# Patient Record
Sex: FEMALE | Race: WHITE | Employment: OTHER | URBAN - METROPOLITAN AREA
[De-identification: names, ages, dates, MRNs, and addresses within clinical notes are randomized per-mention and may not be internally consistent; named-entity substitution may affect disease eponyms.]

---

## 2017-03-06 ENCOUNTER — HOSPITAL ENCOUNTER (OUTPATIENT)
Dept: PHYSICAL THERAPY | Age: 67
Discharge: HOME | End: 2017-03-06
Payer: COMMERCIAL

## 2017-03-06 PROCEDURE — 97162 PT EVAL MOD COMPLEX 30 MIN: CPT

## 2017-03-06 PROCEDURE — G8979 MOBILITY GOAL STATUS: HCPCS

## 2017-03-06 PROCEDURE — G8978 MOBILITY CURRENT STATUS: HCPCS

## 2017-03-06 NOTE — PROGRESS NOTES
Ambulatory/Rehab Services H2 Model Falls Risk Assessment    Risk Factor Pts. ·   Confusion/Disorientation/Impulsivity  []    4 ·   Symptomatic Depression  []   2 ·   Altered Elimination  []   1 ·   Dizziness/Vertigo  [x]   1 ·   Gender (Male)  []   1 ·   Any administered antiepileptics (anticonvulsants):  []   2 ·   Any administered benzodiazepines:  []   1 ·   Visual Impairment (specify):  []   1 ·   Portable Oxygen Use  []   1 ·   Orthostatic ? BP  []   1 ·   History of Recent Falls (within 3 mos.)  []   5     Ability to Rise from Chair (choose one) Pts. ·   Ability to rise in a single movement  []   0 ·   Pushes up, successful in one attempt  [x]   1 ·   Multiple attempts, but successful  []   3 ·   Unable to rise without assistance  []   4   Total: (5 or greater = High Risk) 2     Falls Prevention Plan:   []                Physical Limitations to Exercise (specify):   []                Mobility Assistance Device (type):   [x]                Exercise/Equipment Adaptation (specify): proper guarding during exercises  ©2010 [facility] [address] [place] [address] Patent #[id number].  Federal Law prohibits the replication, distribution or use without written permission from [facility]

## 2017-03-06 NOTE — PROGRESS NOTES
Tang Lugo  : 1950 [facility]  [facility] [address], [place] [address]  [phone]   [phone]        OUTPATIENT PHYSICAL THERAPY:Initial Assessment 3/6/2017    ICD-10: Treatment Diagnosis: Muscle weakness (generalized) (M62.81), Difficulty in walking, not elsewhere classified (R26.2), Dizziness and giddiness (R42)  Precautions/Allergies:   Acetaminophen; Penicillin g; and Other medication   Fall Risk Score: 2 (? 5 = High Risk)  MD Orders: evaluate and treat MEDICAL/REFERRING DIAGNOSIS:  Unspecified abnormalities of gait and mobility [R26.9]   REFERRING PHYSICIAN: Yahaira Mohamud MD  RETURN PHYSICIAN APPOINTMENT: --     INITIAL ASSESSMENT:  Ms. Katey Merrill presents with balance deficits that affect gait and function. She demonstrates general weakness in LEs, but also sudden balance issues with head movements, and is unable to lay supine due to increased dizziness. There is a concern for vertigo symptoms affecting balance also. Pt will benefit from skilled physical therapy to address dysfunctions, we will also try to get her in to see a physical therapist that specializes in vestibular disorders. PROBLEM LIST (Impacting functional limitations):  1. Decreased Strength  2. Decreased Transfer Abilities  3. Decreased Ambulation Ability/Technique  4. Decreased Balance  5. Decreased [place] with Home Exercise Program INTERVENTIONS PLANNED:  1. Home Exercise Program (HEP)  2. Manual Therapy including joint and soft tissue manipulation and mobilization, and dry needling  3. Therapeutic Exercise/Strengthening  4. Modalities including heat/cold application and electrical stimulation   TREATMENT PLAN:  Effective Dates: 3/6/17 TO 17. Frequency/Duration: 1-2 times a week for 5 weeks  GOALS: (Goals have been discussed and agreed upon with patient.)  Short-Term Functional Goals: Time Frame: 5 weeks  1. Pt will be independent with > or = 2 exercises in HEP. 2. Pt will demonstrate > or = 23 on Mejia. Discharge Goals: Time Frame: 10 weeks  1. Pt will be independent with > or = 4 exercises in HEP. 2. Pt will demonstrate > or = 34 on Mejia. Rehabilitation Potential For Stated Goals: [address] 99 Weeks's therapy, I certify that the treatment plan above will be carried out by a therapist or under their direction. Thank you for this referral,  Delgado Amado, PT, DPT     Referring Physician Signature: Mckenzie Ross MD              Date                    The information in this section was collected on 3/6/17 (except where otherwise noted). HISTORY:   History of Present Injury/Illness (Reason for Referral):  Pt has a report of balance issues ongoing for a while. Her doctor referred to physical therapy to address balance deficits and weakness in LE's. B knee pain that has been helped by injections in the past for short periods of time. Also suffers from [employer]". Has been seeing her son-in-law who is a chiropractor for the vertigo, which has helped some but has no lasting effect. Also broke R ankle many years ago and thinks that the R LE is generally weaker and less stable. Past Medical History/Comorbidities:   Ms. Caity Drummond  has a past medical history of Abdominal wall hernias (11/15/2016); Allergic rhinitis (11/15/2016); Arthritis (11/15/2016); Bipolar disorder ([address] (11/15/2016); CHF (congestive heart failure) ([address] (11/15/2016); Diabetes mellitus type 2, controlled ([address] (11/15/2016); Diverticulitis of colon (without mention of hemorrhage) (6/17/2013); Diverticulosis (11/15/2016); Dyslipidemia (11/15/2016); Esophageal reflux (11/15/2016); Fatigue (11/15/2016); Gout, Acute (11/15/2016); Heart failure ([address] Hypertension; Hypertension, essential, benign (11/15/2016); Hypomagnesemia (11/15/2016); Knee pain (11/15/2016); Morbid obesity ([address] (11/15/2016); Osteoarthritis (11/15/2016); Short bowel syndrome (11/15/2016);  Tremor, hereditary, benign (11/15/2016); 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 Ms. Dania Jaramillo  has a past surgical history that includes cholecystectomy; tubal ligation; hernia repair; and colectomy. Social History/Living Environment:     house, lives with daughter  Prior Level of Function/Work/Activity:  independent  Current Medications:       Current Outpatient Prescriptions:   [address] 364. REC. ANLOG (LANTUS SOLOSTAR SC), 100Unit/ML, 20 Units at bedtime, Disp: , Rfl:     losartan (COZAAR) 100 mg tablet, Take 100 mg by mouth daily. Instead of lisinopril, Disp: , Rfl:     loratadine (CLARITIN) 10 mg tablet, Take 10 mg by mouth daily. , Disp: , Rfl:     diclofenac (VOLTAREN) 1 % gel, 4 g. Apply 4g to knees up to 4 x per day and 2g to shoulder up to 4 x per day for OA pain, Disp: , Rfl:     traMADol (ULTRAM) 50 mg tablet, Take 50 mg by mouth. 1/2 to 1 tablet 3 x per day as needed for arthritis pain, Disp: , Rfl:     Cetirizine (ZYRTEC) 10 mg cap, Take 10 mg by mouth nightly., Disp: , Rfl:     colestipol (COLESTID) 1 gram tablet, Take 1 g by mouth. 2 tablets 2 times daily, Disp: , Rfl:     PEN NEEDLE, DIABETIC (PEN NEEDLES), 5/16\" 31G x 8MM, 1 Box 2 x per day, Disp: , Rfl:     isosorbide mononitrate ER (IMDUR) 30 mg tablet, 30 mg. 1/2 oral at bedtime, Disp: , Rfl:     MULTIVITAMIN PO, Active., Disp: , Rfl:     ASCORBATE CALCIUM (VITAMIN C PO), Active., Disp: , Rfl:     aspirin 81 mg chewable tablet, Take 81 mg by mouth daily. , Disp: , Rfl:     DIGOXIN PO, Take 0.125 mg by mouth daily. , Disp: , Rfl:     diltiazem hcl (CARDIZEM) 120 mg tablet, Take 120 mg by mouth four (4) times daily. , Disp: , Rfl:     CARVEDILOL (COREG PO), Take 6.25 mg by mouth two (2) times a day., Disp: , Rfl:     atorvastatin (LIPITOR) 20 mg tablet, Take 20 mg by mouth daily. , Disp: , Rfl:     lisinopril (PRINIVIL, ZESTRIL) 10 mg tablet, Take  by mouth daily. , Disp: , Rfl:     metFORMIN (GLUCOPHAGE) 500 mg tablet, Take 500 mg by mouth daily (with breakfast). , Disp: , Rfl:     furosemide (LASIX) 40 mg tablet, Take  by mouth daily. , Disp: , Rfl:     venlafaxine (EFFEXOR) 75 mg tablet, Take 25 mg by mouth two (2) times a day., Disp: , Rfl:     magnesium oxide (MAG-OX) 400 mg tablet, Take 400 mg by mouth daily. , Disp: , Rfl:     potassium chloride (K-DUR, KLOR-CON) 20 mEq tablet, Take  by mouth daily. , Disp: , Rfl:     multivitamins-minerals-lutein (CENTRUM SILVER) Tab, Take  by mouth., Disp: , Rfl:     b complex vitamins (SUPER B-50 COMPLEX) capsule, Take 1 Cap by mouth daily. , Disp: , Rfl:     omega-3 fatty acids-vitamin e (FISH OIL) 1,000 mg cap, Take 1 Cap by mouth., Disp: , Rfl:     Calcium-Cholecalciferol, D3, (CALCIUM 600 WITH VITAMIN D3) 600 mg(1,500mg) -400 unit cap, Take  by mouth., Disp: , Rfl:     coenzyme Q-10 (CO Q-10) 200 mg capsule, Take  by mouth daily. , Disp: , Rfl:    Date Last Reviewed:  3/6/2017     Number of Personal Factors/Comorbidities that affect the Plan of Care: 1-2: MODERATE COMPLEXITY   EXAMINATION:   Observation/Orthostatic Postural Assessment:          In standing pt tends to weight-bear more through L LE, R LE resting position is into external rotation. Walks with straight cane in R hand. Palpation:          No palpation done  ROM:          R tibial internal rotation to neutral, but not past neutral  Strength:          Grossly decreased LE strength and endurance.  Unable to assess specific musculature due to complaints of dizziness  Balance:          Deficits impair ability to walk and affect safety  SFMA Top Tier (FN = Functional and Non-painful, FP = Functional and Painful, DP = Dysfunctional and Painful, DN = Dysfunctional and Non-painful)  Cervical flexion: DN  Cervical extension: DN   Cervical rotation: L DN, R DN  Upper Extremity Pattern 1 (extension, IR): L --, R --  Upper Extremity Pattern 2 (flexion, ER): L --, R --  Multi-segmental flexion: --  Multi-segmental extension: --  Multi-segmental rotation: L --, R --  Single-Leg Stance: L --, R --  Overhead Deep Squat: --   Summary: cervical movements, especially cervical extension, results in experiencing dizziness that impairs balance     Body Structures Involved:  1. Nerves  2. Joints  3. Muscles Body Functions Affected:  1. Neuromusculoskeletal  2. Movement Related Activities and Participation Affected:  1. General Tasks and Demands  2. Mobility  3. Self Care  4. Interpersonal Interactions and Relationships   Number of elements (examined above) that affect the Plan of Care: 3: MODERATE COMPLEXITY   CLINICAL PRESENTATION:   Presentation: Evolving clinical presentation with changing clinical characteristics: MODERATE COMPLEXITY   CLINICAL DECISION MAKING:   Outcome Measure: Tool Used: [place] Balance Scale  Score:  Initial: 17/56 Most Recent: X/56 (Date: -- )   Interpretation of Score: Each section is scored on a 0-4 scale, 0 representing the patients inability to perform the task and 4 representing independence. The scores of each section are added together for a total score of 56. The higher the patients score, the more independent the patient is. Any score below 45 indicates increased risk for falls. Score 56 55-45 44-34 33-23 22-12 11-1 0   Modifier CH CI CJ CK CL CM CN     ? Mobility - Walking and Moving Around:     - CURRENT STATUS: CL - 60%-79% impaired, limited or restricted    - GOAL STATUS: CJ - 20%-39% impaired, limited or restricted    - D/C STATUS:  ---------------To be determined---------------    Tool Used: Lower Extremity Functional Scale (LEFS)  Score:  Initial: 15/80 Most Recent: X/80 (Date: -- )   Interpretation of Score: 20 questions each scored on a 5 point scale with 0 representing \"extreme difficulty or unable to perform\" and 4 representing \"no difficulty\". The lower the score, the greater the functional disability. 80/80 represents no disability.   Minimal detectable change is 9 points. Score 80 79-63 62-48 47-32 31-16 15-1 0   Modifier CH CI CJ CK CL CM CN     Medical Necessity:   · Skilled intervention continues to be required due to decreased function. Reason for Services/Other Comments:  · Patient continues to require skilled intervention due to decreased function. Use of outcome tool(s) and clinical judgement create a POC that gives a: Questionable prediction of patient's progress: MODERATE COMPLEXITY            TREATMENT:   (In addition to Assessment/Re-Assessment sessions the following treatments were rendered)  Pre-treatment Symptoms/Complaints:  Difficulty walking, standing  Pain: Initial:     -- Post Session:  --     THERAPEUTIC EXERCISE: ( minutes):  Exercises per grid below to improve mobility and strength. Date:   Date:   Date:     Activity/Exercise Parameters Parameters Parameters                                                 MANUAL THERAPY: ( minutes): To increase motion and reduce pain    MODALITIES: ( minutes):       Treatment/Session Assessment:    · Response to Treatment:  Pt experiences loss of balance with all head movements. · Compliance with Program/Exercises: Will assess as treatment progresses. · Recommendations/Intent for next treatment session: \"Next visit will focus on advancements to more challenging activities\". Total Treatment Duration:  PT Patient Time In/Time Out  Time In: 1130  Time Out: 1200    No future appointments.     Joe Boateng, PT, DPT

## 2017-03-13 ENCOUNTER — HOSPITAL ENCOUNTER (OUTPATIENT)
Dept: WOUND CARE | Age: 67
Discharge: HOME | End: 2017-03-13
Attending: PHYSICAL MEDICINE & REHABILITATION
Payer: COMMERCIAL

## 2017-03-13 PROCEDURE — 99214 OFFICE O/P EST MOD 30 MIN: CPT

## 2017-03-13 PROCEDURE — 87077 CULTURE AEROBIC IDENTIFY: CPT | Performed by: INTERNAL MEDICINE

## 2017-03-13 PROCEDURE — 87076 CULTURE ANAEROBE IDENT EACH: CPT | Performed by: INTERNAL MEDICINE

## 2017-03-13 PROCEDURE — 87186 SC STD MICRODIL/AGAR DIL: CPT | Performed by: INTERNAL MEDICINE

## 2017-03-13 PROCEDURE — 87075 CULTR BACTERIA EXCEPT BLOOD: CPT | Performed by: INTERNAL MEDICINE

## 2017-03-13 PROCEDURE — 87205 SMEAR GRAM STAIN: CPT | Performed by: INTERNAL MEDICINE

## 2017-03-16 LAB
BACTERIA SPEC CULT: ABNORMAL
BACTERIA SPEC CULT: ABNORMAL
GRAM STN SPEC: ABNORMAL
GRAM STN SPEC: ABNORMAL
SERVICE CMNT-IMP: ABNORMAL

## 2017-03-24 LAB
ANAEROBE ID RESULT 1, RAND1T: NORMAL
ANAEROBE ID W/SUSCEPT., ANIDLT: NORMAL
SPECIMEN SOURCE: NORMAL

## 2017-03-27 LAB
BACTERIA SPEC CULT: ABNORMAL
SERVICE CMNT-IMP: ABNORMAL

## 2017-03-31 PROCEDURE — 99213 OFFICE O/P EST LOW 20 MIN: CPT

## 2017-03-31 PROCEDURE — 82962 GLUCOSE BLOOD TEST: CPT

## 2017-03-31 PROCEDURE — 77030035128 HC DRSG ANTIMIC FOAM HYDROFERA HOLL -A

## 2017-04-07 ENCOUNTER — HOSPITAL ENCOUNTER (OUTPATIENT)
Dept: WOUND CARE | Age: 67
Discharge: HOME | End: 2017-04-07
Attending: PHYSICAL MEDICINE & REHABILITATION
Payer: COMMERCIAL

## 2017-04-07 PROCEDURE — 99212 OFFICE O/P EST SF 10 MIN: CPT

## 2017-04-12 ENCOUNTER — HOSPITAL ENCOUNTER (OUTPATIENT)
Dept: PHYSICAL THERAPY | Age: 67
Discharge: HOME | End: 2017-04-12
Payer: COMMERCIAL

## 2017-04-12 PROCEDURE — 97164 PT RE-EVAL EST PLAN CARE: CPT

## 2017-04-12 PROCEDURE — G8979 MOBILITY GOAL STATUS: HCPCS

## 2017-04-12 PROCEDURE — G8978 MOBILITY CURRENT STATUS: HCPCS

## 2017-04-12 NOTE — PROGRESS NOTES
Ambulatory/Rehab Services H2 Model Falls Risk Assessment    Risk Factor Pts. ·   Confusion/Disorientation/Impulsivity  []    4 ·   Symptomatic Depression  []   2 ·   Altered Elimination  []   1 ·   Dizziness/Vertigo  [x]   1 ·   Gender (Male)  []   1 ·   Any administered antiepileptics (anticonvulsants):  []   2 ·   Any administered benzodiazepines:  []   1 ·   Visual Impairment (specify):  []   1 ·   Portable Oxygen Use  []   1 ·   Orthostatic ? BP  []   1 ·   History of Recent Falls (within 3 mos.)  []   5     Ability to Rise from Chair (choose one) Pts. ·   Ability to rise in a single movement  []   0 ·   Pushes up, successful in one attempt  [x]   1 ·   Multiple attempts, but successful  []   3 ·   Unable to rise without assistance  []   4   Total: (5 or greater = High Risk) 2     Falls Prevention Plan:   []                Physical Limitations to Exercise (specify):   []                Mobility Assistance Device (type):   []                Exercise/Equipment Adaptation (specify):    ©2010 [facility] [address] Patent #[id number].  Federal Law prohibits the replication, distribution or use without written permission from [facility] [employer]

## 2017-04-12 NOTE — PROGRESS NOTES
Kirsten Gannon  : 1950 Kirsten Gannon  : 1950 [facility], [address] [address], [place] [address].  [phone]   [phone]               OUTPATIENT PHYSICAL THERAPY:Re-Evaluation 2017    ICD-10: Treatment Diagnosis: Muscle weakness (generalized) (M62.81), Difficulty in walking, not elsewhere classified (R26.2), Dizziness and giddiness (R42)  Precautions/Allergies:   Acetaminophen; Penicillin g; and Other medication   Fall Risk Score: 2 (? 5 = High Risk)  MD Orders: evaluate and treat MEDICAL/REFERRING DIAGNOSIS:  Unspecified abnormalities of gait and mobility [R26.9]   REFERRING PHYSICIAN: Melva Buck MD  RETURN PHYSICIAN APPOINTMENT: --      ASSESSMENT: 17  Ms. Roy presents with balance deficits that affect gait and function. She demonstrates general weakness in LEs, and imbalance with change of position, head or body movements. Patient is unable to lay supine due to increased dizziness, but patient reports this has been true since she was 70 years old when her eardrum burst. Patient demonstrates high fall risk and would benefit from skilled physical therapy to improve her balance, gait, strength and safety with daily activities. PROBLEM LIST (Impacting functional limitations):  1. Decreased Strength  2. Decreased Transfer Abilities  3. Decreased Ambulation Ability/Technique  4. Decreased Balance  5. Decreased [place] with Home Exercise Program INTERVENTIONS PLANNED:  1. Home Exercise Program (HEP)  2. Therapeutic Exercise/Strengthening  3. Neuromuscular re-education  4. Balance training  5. Vestibular training   TREATMENT PLAN:  Effective Dates: 2017 TO 2017. Frequency/Duration: 1-2 times a week for 8-12 weeks  GOALS: (Goals have been discussed and agreed upon with patient.)  Short-Term Functional Goals: Time Frame: 2-4 weeks  1.  Patient will demonstrate independence and compliance with home exercise program to improve balance and strength for daily activities. 2.    Discharge Goals: Time Frame: 8-12 weeks  1. Patient will increase her score on the Mejia Balance Scale to greater than or equal to 38/56 indicating improved safety and decreased fall risk for daily activities. 2. Patient will ambulate with least assistive device over level and unlevel surfaces without evidence of imbalance to improve safety for daily activities. 3. Patient will increase bilateral lower extremity strength to greater than or equal to 4+/5 to improve strength for functional mobility activities. 4.     Rehabilitation Potential For Stated Goals: Tete 99 Weeks's therapy, I certify that the treatment plan above will be carried out by a therapist or under their direction. Thank you for this referral,  Kelsi Mcghee PT     Referring Physician Signature: Dorinda Kurtz MD              Date                    The information in this section was collected on 4/12/17 (except where otherwise noted). HISTORY:   History of Present Injury/Illness (Reason for Referral):  Pt has a report of balance issues ongoing for a while. Her doctor referred to physical therapy to address balance deficits and weakness in LE's. B knee pain that has been helped by injections in the past for short periods of time. Also suffers from [employer]". Has been seeing her son-in-law who is a chiropractor for the vertigo, which has helped some but has no lasting effect. Also broke R ankle many years ago and thinks that the R LE is generally weaker and less stable. At 70 years old, L ear drum burst, and has had ringing in ear since then. Balance has always been off. Recently L eardrum burst again. When lie flat, get very dizzy, since she can remember. Dizziness continues as she is laying, does not stop. Sleeps on either side or sitting up. Gets off balance if turn too fast or get up too fast, and sometimes just standing. Ingrid Cruz about a year ago. No dizziness now. Hard time walking/taking a walk, getting out of tub (lifting one foot to get in/out), standing to shower. Past Medical History/Comorbidities:   Ms. Augustin Wild  has a past medical history of Abdominal wall hernias (11/15/2016); Allergic rhinitis (11/15/2016); Arthritis (11/15/2016); Bipolar disorder ([facility] (11/15/2016); CHF (congestive heart failure) ([facility] (11/15/2016); Depression; Diabetes mellitus type 2, controlled ([facility] (11/15/2016); Diverticulitis of colon (without mention of hemorrhage) (6/17/2013); Diverticulosis (11/15/2016); Dyslipidemia (11/15/2016); Esophageal reflux (11/15/2016); Fatigue (11/15/2016); Gout, Acute (11/15/2016); [facility] Hypertension; Hypertension, essential, benign (11/15/2016); Hypomagnesemia (11/15/2016); Knee pain (11/15/2016); Morbid obesity ([facility] (11/15/2016); Osteoarthritis (11/15/2016); Short bowel syndrome (11/15/2016); Tremor, hereditary, benign (11/15/2016); 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 Ms. Augustin Wild  has a past surgical history that includes cholecystectomy; tubal ligation; hernia repair; and colectomy. Social History/Living Environment:     house, lives with daughter  Prior Level of Function/Work/Activity:  independent  Current Medications:       Current Outpatient Prescriptions:   Krystle Chilel 364. REC. ANLOG (LANTUS SOLOSTAR SC), 100Unit/ML, 20 Units at bedtime, Disp: , Rfl:     losartan (COZAAR) 100 mg tablet, Take 100 mg by mouth daily. Instead of lisinopril, Disp: , Rfl:     loratadine (CLARITIN) 10 mg tablet, Take 10 mg by mouth daily. , Disp: , Rfl:     diclofenac (VOLTAREN) 1 % gel, 4 g. Apply 4g to knees up to 4 x per day and 2g to shoulder up to 4 x per day for OA pain, Disp: , Rfl:     traMADol (ULTRAM) 50 mg tablet, Take 50 mg by mouth.  1/2 to 1 tablet 3 x per day as needed for arthritis pain, Disp: , Rfl:     Cetirizine (ZYRTEC) 10 mg cap, Take 10 mg by mouth nightly., Disp: , Rfl:     colestipol (COLESTID) 1 gram tablet, Take 1 g by mouth. 2 tablets 2 times daily, Disp: , Rfl:     PEN NEEDLE, DIABETIC (PEN NEEDLES), 5/16\" 31G x 8MM, 1 Box 2 x per day, Disp: , Rfl:     isosorbide mononitrate ER (IMDUR) 30 mg tablet, 30 mg. 1/2 oral at bedtime, Disp: , Rfl:     MULTIVITAMIN PO, Active., Disp: , Rfl:     ASCORBATE CALCIUM (VITAMIN C PO), Active., Disp: , Rfl:     aspirin 81 mg chewable tablet, Take 81 mg by mouth daily. , Disp: , Rfl:     DIGOXIN PO, Take 0.125 mg by mouth daily. , Disp: , Rfl:     diltiazem hcl (CARDIZEM) 120 mg tablet, Take 120 mg by mouth four (4) times daily. , Disp: , Rfl:     CARVEDILOL (COREG PO), Take 6.25 mg by mouth two (2) times a day., Disp: , Rfl:     atorvastatin (LIPITOR) 20 mg tablet, Take 20 mg by mouth daily. , Disp: , Rfl:     lisinopril (PRINIVIL, ZESTRIL) 10 mg tablet, Take  by mouth daily. , Disp: , Rfl:     metFORMIN (GLUCOPHAGE) 500 mg tablet, Take 500 mg by mouth daily (with breakfast). , Disp: , Rfl:     furosemide (LASIX) 40 mg tablet, Take  by mouth daily. , Disp: , Rfl:     venlafaxine (EFFEXOR) 75 mg tablet, Take 25 mg by mouth two (2) times a day., Disp: , Rfl:     magnesium oxide (MAG-OX) 400 mg tablet, Take 400 mg by mouth daily. , Disp: , Rfl:     potassium chloride (K-DUR, KLOR-CON) 20 mEq tablet, Take  by mouth daily. , Disp: , Rfl:     multivitamins-minerals-lutein (CENTRUM SILVER) Tab, Take  by mouth., Disp: , Rfl:     b complex vitamins (SUPER B-50 COMPLEX) capsule, Take 1 Cap by mouth daily. , Disp: , Rfl:     omega-3 fatty acids-vitamin e (FISH OIL) 1,000 mg cap, Take 1 Cap by mouth., Disp: , Rfl:     Calcium-Cholecalciferol, D3, (CALCIUM 600 WITH VITAMIN D3) 600 mg(1,500mg) -400 unit cap, Take  by mouth., Disp: , Rfl:     coenzyme Q-10 (CO Q-10) 200 mg capsule, Take  by mouth daily. , Disp: , Rfl:    Date Last Reviewed:  4/12/2017     Number of Personal Factors/Comorbidities that affect the Plan of Care: 1-2: MODERATE COMPLEXITY   EXAMINATION:   Observation/Orthostatic Postural Assessment:          In standing pt tends to weight-bear more through L LE, R LE resting position is into external rotation. Hx of fractured R ankle        Walks with straight cane in R hand. Palpation:          No palpation done  ROM:          R tibial internal rotation to neutral, but not past neutral  Strength:          LE STRENGTH:  4-/5 hip flexion, 4/5 hip abduction, 4/5 hip adduction, 4/5 hip extension, 4/5 knee extension, 4/5 knee flexion, 4/5 ankle dorsiflexion, 4/5 ankle plantarflexion  Vestibular:  Smooth pursuit: WNL  VOR: WNL  VOR cancellation: WNL    SFMA Top Tier (FN = Functional and Non-painful, FP = Functional and Painful, DP = Dysfunctional and Painful, DN = Dysfunctional and Non-painful)  Cervical flexion: DN  Cervical extension: DN   Cervical rotation: L DN, R DN  Upper Extremity Pattern 1 (extension, IR): L --, R --  Upper Extremity Pattern 2 (flexion, ER): L --, R --  Multi-segmental flexion: --  Multi-segmental extension: --  Multi-segmental rotation: L --, R --  Single-Leg Stance: L --, R --  Overhead Deep Squat: --   Summary: cervical movements, especially cervical extension, results in experiencing dizziness that impairs balance     Body Structures Involved:  1. Nerves  2. Joints  3. Muscles Body Functions Affected:  1. Neuromusculoskeletal  2. Movement Related Activities and Participation Affected:  1. General Tasks and Demands  2. Mobility  3. Self Care  4. Interpersonal Interactions and Relationships   Number of elements (examined above) that affect the Plan of Care: 3: MODERATE COMPLEXITY   CLINICAL PRESENTATION:   Presentation: Evolving clinical presentation with changing clinical characteristics: MODERATE COMPLEXITY   CLINICAL DECISION MAKING:   Outcome Measure:    Tool Used: Mejia Balance Scale  Score:  Initial: 17/56 Most Recent: 19/56 (Date: 4/12/17 )   Interpretation of Score: Each section is scored on a 0-4 scale, 0 representing the patients inability to perform the task and 4 representing independence. The scores of each section are added together for a total score of 56. The higher the patients score, the more independent the patient is. Any score below 45 indicates increased risk for falls. Score 56 55-45 44-34 33-23 22-12 11-1 0   Modifier CH CI CJ CK CL CM CN     ? Mobility - Walking and Moving Around:     - CURRENT STATUS: CL - 60%-79% impaired, limited or restricted    - GOAL STATUS: CJ - 20%-39% impaired, limited or restricted    - D/C STATUS:  ---------------To be determined---------------    Tool Used: Lower Extremity Functional Scale (LEFS)  Score:  Initial: 15/80 Most Recent: X/80 (Date: -- )   Interpretation of Score: 20 questions each scored on a 5 point scale with 0 representing \"extreme difficulty or unable to perform\" and 4 representing \"no difficulty\". The lower the score, the greater the functional disability. 80/80 represents no disability. Minimal detectable change is 9 points. Score 80 79-63 62-48 47-32 31-16 15-1 0   Modifier CH CI CJ CK CL CM CN     Medical Necessity:   · Skilled intervention continues to be required due to decreased function. Reason for Services/Other Comments:  · Patient continues to require skilled intervention due to decreased function. Use of outcome tool(s) and clinical judgement create a POC that gives a: Questionable prediction of patient's progress: MODERATE COMPLEXITY            TREATMENT:   (In addition to Assessment/Re-Assessment sessions the following treatments were rendered)  Pre-treatment Symptoms/Complaints:  Difficulty walking, standing  Pain: Initial:   Pain Intensity 1: 0 -- Post Session:  --     Neuromuscular Re-education ( ):  Exercise/activities per grid below to improve balance, coordination, posture and proprioception.   Required minimal verbal cues to promote static and dynamic balance in standing. Treatment/Session Assessment:    · Response to Treatment:  Patient tolerated session well and verbalized understanding of HEP. · Compliance with Program/Exercises: Will assess as treatment progresses. · Recommendations/Intent for next treatment session: \"Next visit will focus on advancements to more challenging activities\". Total Treatment Duration:  PT Patient Time In/Time Out  Time In: 0915  Time Out: 1005    No future appointments.     Nick Marie, PT

## 2017-04-17 ENCOUNTER — HOSPITAL ENCOUNTER (OUTPATIENT)
Dept: PHYSICAL THERAPY | Age: 67
Discharge: HOME | End: 2017-04-17
Payer: COMMERCIAL

## 2017-04-17 PROCEDURE — 97112 NEUROMUSCULAR REEDUCATION: CPT

## 2017-04-17 NOTE — PROGRESS NOTES
Damaris Loaiza  : 1950 Damaris Loaiza  : 1950 [facility]  [address], [address] [address], [place] [address].  [phone]   [phone]               OUTPATIENT PHYSICAL THERAPY: Daily Note 2017    ICD-10: Treatment Diagnosis: Muscle weakness (generalized) (M62.81), Difficulty in walking, not elsewhere classified (R26.2), Dizziness and giddiness (R42)  Precautions/Allergies:   Acetaminophen; Penicillin g; and Other medication   Fall Risk Score: 2 (? 5 = High Risk)  MD Orders: evaluate and treat MEDICAL/REFERRING DIAGNOSIS:  Unspecified abnormalities of gait and mobility [R26.9]   REFERRING PHYSICIAN: Ashley Izquierdo MD  RETURN PHYSICIAN APPOINTMENT: --      ASSESSMENT: 17  Ms. Roy presents with balance deficits that affect gait and function. She demonstrates general weakness in LEs, and imbalance with change of position, head or body movements. Patient is unable to lay supine due to increased dizziness, but patient reports this has been true since she was 70 years old when her eardrum burst. Patient demonstrates high fall risk and would benefit from skilled physical therapy to improve her balance, gait, strength and safety with daily activities. PROBLEM LIST (Impacting functional limitations):  1. Decreased Strength  2. Decreased Transfer Abilities  3. Decreased Ambulation Ability/Technique  4. Decreased Balance  5. Decreased [place] with Home Exercise Program INTERVENTIONS PLANNED:  1. Home Exercise Program (HEP)  2. Therapeutic Exercise/Strengthening  3. Neuromuscular re-education  4. Balance training  5. Vestibular training   TREATMENT PLAN:  Effective Dates: 2017 TO 2017. Frequency/Duration: 1-2 times a week for 8-12 weeks  GOALS: (Goals have been discussed and agreed upon with patient.)  Short-Term Functional Goals: Time Frame: 2-4 weeks  1.  Patient will demonstrate independence and compliance with home exercise program to improve balance and strength for daily activities. MET  2.    Discharge Goals: Time Frame: 8-12 weeks  1. Patient will increase her score on the Mejia Balance Scale to greater than or equal to 38/56 indicating improved safety and decreased fall risk for daily activities. 2. Patient will ambulate with least assistive device over level and unlevel surfaces without evidence of imbalance to improve safety for daily activities. 3. Patient will increase bilateral lower extremity strength to greater than or equal to 4+/5 to improve strength for functional mobility activities. 4.     Rehabilitation Potential For Stated Goals: Tete Cee Weeks's therapy, I certify that the treatment plan above will be carried out by a therapist or under their direction. Thank you for this referral,  Danis Varela PT     Referring Physician Signature: Adriana Howe MD              Date                    The information in this section was collected on 4/12/17 (except where otherwise noted). HISTORY:   History of Present Injury/Illness (Reason for Referral):  Pt has a report of balance issues ongoing for a while. Her doctor referred to physical therapy to address balance deficits and weakness in LE's. B knee pain that has been helped by injections in the past for short periods of time. Also suffers from [employer]". Has been seeing her son-in-law who is a chiropractor for the vertigo, which has helped some but has no lasting effect. Also broke R ankle many years ago and thinks that the R LE is generally weaker and less stable. At 70 years old, L ear drum burst, and has had ringing in ear since then. Balance has always been off. Recently L eardrum burst again. When lie flat, get very dizzy, since she can remember. Dizziness continues as she is laying, does not stop. Sleeps on either side or sitting up. Gets off balance if turn too fast or get up too fast, and sometimes just standing.  Dulce Maria Odonnell about a year ago. No dizziness now. Hard time walking/taking a walk, getting out of tub (lifting one foot to get in/out), standing to shower. Past Medical History/Comorbidities:   Ms. Ti Bishop  has a past medical history of Abdominal wall hernias (11/15/2016); Allergic rhinitis (11/15/2016); Arthritis (11/15/2016); Bipolar disorder ([facility] [address] (11/15/2016); CHF (congestive heart failure) ([facility] [address] (11/15/2016); Depression; Diabetes mellitus type 2, controlled ([facility] [address] (11/15/2016); Diverticulitis of colon (without mention of hemorrhage) (6/17/2013); Diverticulosis (11/15/2016); Dyslipidemia (11/15/2016); Esophageal reflux (11/15/2016); Fatigue (11/15/2016); Gout, Acute (11/15/2016); Heart failure ([facility] Hypertension; Hypertension, essential, benign (11/15/2016); Hypomagnesemia (11/15/2016); Knee pain (11/15/2016); Morbid obesity ([facility] [address] (11/15/2016); Osteoarthritis (11/15/2016); Short bowel syndrome (11/15/2016); Tremor, hereditary, benign (11/15/2016); 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 Ms. Ti Bishop  has a past surgical history that includes cholecystectomy; tubal ligation; hernia repair; and colectomy. Social History/Living Environment:     house, lives with daughter  Prior Level of Function/Work/Activity:  independent  Current Medications:       Current Outpatient Prescriptions:   [address] 364. REC. ANLOG (LANTUS SOLOSTAR SC), 100Unit/ML, 20 Units at bedtime, Disp: , Rfl:     losartan (COZAAR) 100 mg tablet, Take 100 mg by mouth daily. Instead of lisinopril, Disp: , Rfl:     loratadine (CLARITIN) 10 mg tablet, Take 10 mg by mouth daily. , Disp: , Rfl:     diclofenac (VOLTAREN) 1 % gel, 4 g. Apply 4g to knees up to 4 x per day and 2g to shoulder up to 4 x per day for OA pain, Disp: , Rfl:     traMADol (ULTRAM) 50 mg tablet, Take 50 mg by mouth.  1/2 to 1 tablet 3 x per day as needed for arthritis pain, Disp: , Rfl:     Cetirizine (ZYRTEC) 10 mg cap, Take 10 mg by mouth nightly., Disp: , Rfl:     colestipol (COLESTID) 1 gram tablet, Take 1 g by mouth. 2 tablets 2 times daily, Disp: , Rfl:     PEN NEEDLE, DIABETIC (PEN NEEDLES), 5/16\" 31G x 8MM, 1 Box 2 x per day, Disp: , Rfl:     isosorbide mononitrate ER (IMDUR) 30 mg tablet, 30 mg. 1/2 oral at bedtime, Disp: , Rfl:     MULTIVITAMIN PO, Active., Disp: , Rfl:     ASCORBATE CALCIUM (VITAMIN C PO), Active., Disp: , Rfl:     aspirin 81 mg chewable tablet, Take 81 mg by mouth daily. , Disp: , Rfl:     DIGOXIN PO, Take 0.125 mg by mouth daily. , Disp: , Rfl:     diltiazem hcl (CARDIZEM) 120 mg tablet, Take 120 mg by mouth four (4) times daily. , Disp: , Rfl:     CARVEDILOL (COREG PO), Take 6.25 mg by mouth two (2) times a day., Disp: , Rfl:     atorvastatin (LIPITOR) 20 mg tablet, Take 20 mg by mouth daily. , Disp: , Rfl:     lisinopril (PRINIVIL, ZESTRIL) 10 mg tablet, Take  by mouth daily. , Disp: , Rfl:     metFORMIN (GLUCOPHAGE) 500 mg tablet, Take 500 mg by mouth daily (with breakfast). , Disp: , Rfl:     furosemide (LASIX) 40 mg tablet, Take  by mouth daily. , Disp: , Rfl:     venlafaxine (EFFEXOR) 75 mg tablet, Take 25 mg by mouth two (2) times a day., Disp: , Rfl:     magnesium oxide (MAG-OX) 400 mg tablet, Take 400 mg by mouth daily. , Disp: , Rfl:     potassium chloride (K-DUR, KLOR-CON) 20 mEq tablet, Take  by mouth daily. , Disp: , Rfl:     multivitamins-minerals-lutein (CENTRUM SILVER) Tab, Take  by mouth., Disp: , Rfl:     b complex vitamins (SUPER B-50 COMPLEX) capsule, Take 1 Cap by mouth daily. , Disp: , Rfl:     omega-3 fatty acids-vitamin e (FISH OIL) 1,000 mg cap, Take 1 Cap by mouth., Disp: , Rfl:     Calcium-Cholecalciferol, D3, (CALCIUM 600 WITH VITAMIN D3) 600 mg(1,500mg) -400 unit cap, Take  by mouth., Disp: , Rfl:     coenzyme Q-10 (CO Q-10) 200 mg capsule, Take  by mouth daily. , Disp: , Rfl:    Date Last Reviewed:  4/17/2017     Number of Personal Factors/Comorbidities that affect the Plan of Care: 1-2: MODERATE COMPLEXITY   EXAMINATION:   Observation/Orthostatic Postural Assessment:          In standing pt tends to weight-bear more through L LE, R LE resting position is into external rotation. Hx of fractured R ankle        Walks with straight cane in R hand. Palpation:          No palpation done  ROM:          R tibial internal rotation to neutral, but not past neutral  Strength:          LE STRENGTH:  4-/5 hip flexion, 4/5 hip abduction, 4/5 hip adduction, 4/5 hip extension, 4/5 knee extension, 4/5 knee flexion, 4/5 ankle dorsiflexion, 4/5 ankle plantarflexion  Vestibular:  Smooth pursuit: WNL  VOR: WNL  VOR cancellation: WNL    SFMA Top Tier (FN = Functional and Non-painful, FP = Functional and Painful, DP = Dysfunctional and Painful, DN = Dysfunctional and Non-painful)  Cervical flexion: DN  Cervical extension: DN   Cervical rotation: L DN, R DN  Upper Extremity Pattern 1 (extension, IR): L --, R --  Upper Extremity Pattern 2 (flexion, ER): L --, R --  Multi-segmental flexion: --  Multi-segmental extension: --  Multi-segmental rotation: L --, R --  Single-Leg Stance: L --, R --  Overhead Deep Squat: --   Summary: cervical movements, especially cervical extension, results in experiencing dizziness that impairs balance     Body Structures Involved:  1. Nerves  2. Joints  3. Muscles Body Functions Affected:  1. Neuromusculoskeletal  2. Movement Related Activities and Participation Affected:  1. General Tasks and Demands  2. Mobility  3. Self Care  4. Interpersonal Interactions and Relationships   Number of elements (examined above) that affect the Plan of Care: 3: MODERATE COMPLEXITY   CLINICAL PRESENTATION:   Presentation: Evolving clinical presentation with changing clinical characteristics: MODERATE COMPLEXITY   CLINICAL DECISION MAKING:   Outcome Measure:    Tool Used: Mejia Balance Scale  Score:  Initial: 17/56 Most Recent: 19/56 (Date: 4/12/17 )   Interpretation of Score: Each section is scored on a 0-4 scale, 0 representing the patients inability to perform the task and 4 representing independence. The scores of each section are added together for a total score of 56. The higher the patients score, the more independent the patient is. Any score below 45 indicates increased risk for falls. Score 56 55-45 44-34 33-23 22-12 11-1 0   Modifier CH CI CJ CK CL CM CN     ? Mobility - Walking and Moving Around:     - CURRENT STATUS: CL - 60%-79% impaired, limited or restricted    - GOAL STATUS: CJ - 20%-39% impaired, limited or restricted    - D/C STATUS:  ---------------To be determined---------------    Tool Used: Lower Extremity Functional Scale (LEFS)  Score:  Initial: 15/80 Most Recent: X/80 (Date: -- )   Interpretation of Score: 20 questions each scored on a 5 point scale with 0 representing \"extreme difficulty or unable to perform\" and 4 representing \"no difficulty\". The lower the score, the greater the functional disability. 80/80 represents no disability. Minimal detectable change is 9 points. Score 80 79-63 62-48 47-32 31-16 15-1 0   Modifier CH CI CJ CK CL CM CN     Medical Necessity:   · Skilled intervention continues to be required due to decreased function. Reason for Services/Other Comments:  · Patient continues to require skilled intervention due to decreased function. Use of outcome tool(s) and clinical judgement create a POC that gives a: Questionable prediction of patient's progress: MODERATE COMPLEXITY            TREATMENT:   (In addition to Assessment/Re-Assessment sessions the following treatments were rendered)  Pre-treatment Symptoms/Complaints:  Working on HEP. Pain: Initial:   Pain Intensity 1: 0 0 Post Session:  --     Neuromuscular Re-education (36 Minutes):  Exercise/activities per grid below to improve balance, coordination, posture and proprioception.   Required minimal verbal cues to promote static and dynamic balance in standing. Balance/Vestibular Treatment:   Activity   Date  4/17/17 Date Date Date Date Date   Activity/Exercise   Sets/reps/equipment Sets/reps/  equipment Sets/reps/  equipment Sets/reps/  equipment Sets/reps/  equipment Sets/reps/  equipment   Walking with head turns             Walking with head up & down             Step overs     Over 1/2 foam roll x 15 reps each leg with rail        Step taps     4 inch step x 30 reps        Marching   In place x 30 reps with rail and HHA        Sidestepping   Standing L and R stepping x 10 reps each leg with rail        Crossovers           [place]           Walking  backwards             Tandem walking           Weaving in/out of cones             Picking up cones             Sports cord             [employer] ball           Figure 8s            Circles right/left           Walking with 360 degree turns           Spirals           Weight shifting:    Left & Right      eyes open        Weight shifting: Forward & Backward       eyes open        Static Standing Balance      on floor eyes open and closed; Blue foams eyes open         Standing with feet apart             Standing with feet together             Standing with feet semitandem   Blue foams eyes open         Standing with feet tandem           Single leg stance           Sit to stand   2 x 5 reps from mat table with no UE assist        Hallpike-Urich testing for BPPV (Benign Paroxysmal Positional Vertigo)             Meza-Daroff exercises           Canalith Repositioning treatment/Epley Maneuver  for BPPV (Benign Paroxysmal Positional Vertigo)           [employer] Training: See scanned report. Treatment/Session Assessment:    · Response to Treatment:  Patient did well. She is very shaky and anxious when asked to let go of railing with standing activities. Will see how patient tolerated today, and will adjust as needed next time. · Compliance with Program/Exercises:  Will assess as treatment progresses. · Recommendations/Intent for next treatment session: \"Next visit will focus on advancements to more challenging activities\".   Total Treatment Duration:  PT Patient Time In/Time Out  Time In: 1034  Time Out: 1110    Future Appointments  Date Time Provider Amol Cohen   4/24/2017 12:30 PM [place] Knife, PT [facility]   5/1/2017 11:15 AM Brigitte Knife, PT [facility]   5/8/2017 11:15 AM Brigitte Knife, PT [facility]   5/16/2017 11:15 AM Brigitte Knife, PT [facility]   5/22/2017 11:15 AM Brigitte Knife, PT [facility]   5/30/2017 11:15 AM Brigitte Knife, PT [facility]       [place] Knife, PT

## 2017-04-24 ENCOUNTER — HOSPITAL ENCOUNTER (OUTPATIENT)
Dept: PHYSICAL THERAPY | Age: 67
Discharge: HOME | End: 2017-04-24
Payer: COMMERCIAL

## 2017-04-24 PROCEDURE — 97112 NEUROMUSCULAR REEDUCATION: CPT

## 2017-04-24 NOTE — PROGRESS NOTES
Kaylene Dickey  : 1950 Kaylene Dickey  : 1950 [facility] [address], [address] [place] [address]  [phone]   [phone]               OUTPATIENT PHYSICAL THERAPY: Daily Note 2017    ICD-10: Treatment Diagnosis: Muscle weakness (generalized) (M62.81), Difficulty in walking, not elsewhere classified (R26.2), Dizziness and giddiness (R42)  Precautions/Allergies:   Acetaminophen; Penicillin g; and Other medication   Fall Risk Score: 2 (? 5 = High Risk)  MD Orders: evaluate and treat MEDICAL/REFERRING DIAGNOSIS:  Unspecified abnormalities of gait and mobility [R26.9]   REFERRING PHYSICIAN: Jahaira Queen MD  RETURN PHYSICIAN APPOINTMENT: --      ASSESSMENT: 17  Ms. Roy presents with balance deficits that affect gait and function. She demonstrates general weakness in LEs, and imbalance with change of position, head or body movements. Patient is unable to lay supine due to increased dizziness, but patient reports this has been true since she was 70 years old when her eardrum burst. Patient demonstrates high fall risk and would benefit from skilled physical therapy to improve her balance, gait, strength and safety with daily activities. PROBLEM LIST (Impacting functional limitations):  1. Decreased Strength  2. Decreased Transfer Abilities  3. Decreased Ambulation Ability/Technique  4. Decreased Balance  5. Decreased [place] with Home Exercise Program INTERVENTIONS PLANNED:  1. Home Exercise Program (HEP)  2. Therapeutic Exercise/Strengthening  3. Neuromuscular re-education  4. Balance training  5. Vestibular training   TREATMENT PLAN:  Effective Dates: 2017 TO 2017. Frequency/Duration: 1-2 times a week for 8-12 weeks  GOALS: (Goals have been discussed and agreed upon with patient.)  Short-Term Functional Goals: Time Frame: 2-4 weeks  1.  Patient will demonstrate independence and compliance with home exercise program to improve balance and strength for daily activities. MET  2.    Discharge Goals: Time Frame: 8-12 weeks  1. Patient will increase her score on the Mejia Balance Scale to greater than or equal to 38/56 indicating improved safety and decreased fall risk for daily activities. 2. Patient will ambulate with least assistive device over level and unlevel surfaces without evidence of imbalance to improve safety for daily activities. 3. Patient will increase bilateral lower extremity strength to greater than or equal to 4+/5 to improve strength for functional mobility activities. 4.     Rehabilitation Potential For Stated Goals: Tete 99 Weeks's therapy, I certify that the treatment plan above will be carried out by a therapist or under their direction. Thank you for this referral,  Nette Little PT     Referring Physician Signature: Han Mendiola MD              Date                    The information in this section was collected on 4/12/17 (except where otherwise noted). HISTORY:   History of Present Injury/Illness (Reason for Referral):  Pt has a report of balance issues ongoing for a while. Her doctor referred to physical therapy to address balance deficits and weakness in LE's. B knee pain that has been helped by injections in the past for short periods of time. Also suffers from [employer]". Has been seeing her son-in-law who is a chiropractor for the vertigo, which has helped some but has no lasting effect. Also broke R ankle many years ago and thinks that the R LE is generally weaker and less stable. At 70 years old, L ear drum burst, and has had ringing in ear since then. Balance has always been off. Recently L eardrum burst again. When lie flat, get very dizzy, since she can remember. Dizziness continues as she is laying, does not stop. Sleeps on either side or sitting up. Gets off balance if turn too fast or get up too fast, and sometimes just standing.  Anali Ambrocio about a year ago. No dizziness now. Hard time walking/taking a walk, getting out of tub (lifting one foot to get in/out), standing to shower. Past Medical History/Comorbidities:   Ms. Irene Lehman  has a past medical history of Abdominal wall hernias (11/15/2016); Allergic rhinitis (11/15/2016); Arthritis (11/15/2016); Bipolar disorder ([facility] (11/15/2016); CHF (congestive heart failure) ([facility] (11/15/2016); Depression; Diabetes mellitus type 2, controlled ([facility] (11/15/2016); Diverticulitis of colon (without mention of hemorrhage) (6/17/2013); Diverticulosis (11/15/2016); Dyslipidemia (11/15/2016); Esophageal reflux (11/15/2016); Fatigue (11/15/2016); Gout, Acute (11/15/2016); [facility] Hypertension; Hypertension, essential, benign (11/15/2016); Hypomagnesemia (11/15/2016); Knee pain (11/15/2016); Morbid obesity ([facility] (11/15/2016); Osteoarthritis (11/15/2016); Short bowel syndrome (11/15/2016); Tremor, hereditary, benign (11/15/2016); 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 Ms. Irene Lehman  has a past surgical history that includes cholecystectomy; tubal ligation; hernia repair; and colectomy. Social History/Living Environment:     house, lives with daughter  Prior Level of Function/Work/Activity:  independent  Current Medications:       Current Outpatient Prescriptions:   Krystle Chilel 364. REC. ANLOG (LANTUS SOLOSTAR SC), 100Unit/ML, 20 Units at bedtime, Disp: , Rfl:     losartan (COZAAR) 100 mg tablet, Take 100 mg by mouth daily. Instead of lisinopril, Disp: , Rfl:     loratadine (CLARITIN) 10 mg tablet, Take 10 mg by mouth daily. , Disp: , Rfl:     diclofenac (VOLTAREN) 1 % gel, 4 g. Apply 4g to knees up to 4 x per day and 2g to shoulder up to 4 x per day for OA pain, Disp: , Rfl:     traMADol (ULTRAM) 50 mg tablet, Take 50 mg by mouth.  1/2 to 1 tablet 3 x per day as needed for arthritis pain, Disp: , Rfl:     Cetirizine (ZYRTEC) 10 mg cap, Take 10 mg by mouth nightly., Disp: , Rfl:     colestipol (COLESTID) 1 gram tablet, Take 1 g by mouth. 2 tablets 2 times daily, Disp: , Rfl:     PEN NEEDLE, DIABETIC (PEN NEEDLES), 5/16\" 31G x 8MM, 1 Box 2 x per day, Disp: , Rfl:     isosorbide mononitrate ER (IMDUR) 30 mg tablet, 30 mg. 1/2 oral at bedtime, Disp: , Rfl:     MULTIVITAMIN PO, Active., Disp: , Rfl:     ASCORBATE CALCIUM (VITAMIN C PO), Active., Disp: , Rfl:     aspirin 81 mg chewable tablet, Take 81 mg by mouth daily. , Disp: , Rfl:     DIGOXIN PO, Take 0.125 mg by mouth daily. , Disp: , Rfl:     diltiazem hcl (CARDIZEM) 120 mg tablet, Take 120 mg by mouth four (4) times daily. , Disp: , Rfl:     CARVEDILOL (COREG PO), Take 6.25 mg by mouth two (2) times a day., Disp: , Rfl:     atorvastatin (LIPITOR) 20 mg tablet, Take 20 mg by mouth daily. , Disp: , Rfl:     lisinopril (PRINIVIL, ZESTRIL) 10 mg tablet, Take  by mouth daily. , Disp: , Rfl:     metFORMIN (GLUCOPHAGE) 500 mg tablet, Take 500 mg by mouth daily (with breakfast). , Disp: , Rfl:     furosemide (LASIX) 40 mg tablet, Take  by mouth daily. , Disp: , Rfl:     venlafaxine (EFFEXOR) 75 mg tablet, Take 25 mg by mouth two (2) times a day., Disp: , Rfl:     magnesium oxide (MAG-OX) 400 mg tablet, Take 400 mg by mouth daily. , Disp: , Rfl:     potassium chloride (K-DUR, KLOR-CON) 20 mEq tablet, Take  by mouth daily. , Disp: , Rfl:     multivitamins-minerals-lutein (CENTRUM SILVER) Tab, Take  by mouth., Disp: , Rfl:     b complex vitamins (SUPER B-50 COMPLEX) capsule, Take 1 Cap by mouth daily. , Disp: , Rfl:     omega-3 fatty acids-vitamin e (FISH OIL) 1,000 mg cap, Take 1 Cap by mouth., Disp: , Rfl:     Calcium-Cholecalciferol, D3, (CALCIUM 600 WITH VITAMIN D3) 600 mg(1,500mg) -400 unit cap, Take  by mouth., Disp: , Rfl:     coenzyme Q-10 (CO Q-10) 200 mg capsule, Take  by mouth daily. , Disp: , Rfl:    Date Last Reviewed:  4/24/2017     Number of Personal Factors/Comorbidities that affect the Plan of Care: 1-2: MODERATE COMPLEXITY   EXAMINATION:   Observation/Orthostatic Postural Assessment:          In standing pt tends to weight-bear more through L LE, R LE resting position is into external rotation. Hx of fractured R ankle        Walks with straight cane in R hand. Palpation:          No palpation done  ROM:          R tibial internal rotation to neutral, but not past neutral  Strength:          LE STRENGTH:  4-/5 hip flexion, 4/5 hip abduction, 4/5 hip adduction, 4/5 hip extension, 4/5 knee extension, 4/5 knee flexion, 4/5 ankle dorsiflexion, 4/5 ankle plantarflexion  Vestibular:  Smooth pursuit: WNL  VOR: WNL  VOR cancellation: WNL    SFMA Top Tier (FN = Functional and Non-painful, FP = Functional and Painful, DP = Dysfunctional and Painful, DN = Dysfunctional and Non-painful)  Cervical flexion: DN  Cervical extension: DN   Cervical rotation: L DN, R DN  Upper Extremity Pattern 1 (extension, IR): L --, R --  Upper Extremity Pattern 2 (flexion, ER): L --, R --  Multi-segmental flexion: --  Multi-segmental extension: --  Multi-segmental rotation: L --, R --  Single-Leg Stance: L --, R --  Overhead Deep Squat: --   Summary: cervical movements, especially cervical extension, results in experiencing dizziness that impairs balance     Body Structures Involved:  1. Nerves  2. Joints  3. Muscles Body Functions Affected:  1. Neuromusculoskeletal  2. Movement Related Activities and Participation Affected:  1. General Tasks and Demands  2. Mobility  3. Self Care  4. Interpersonal Interactions and Relationships   Number of elements (examined above) that affect the Plan of Care: 3: MODERATE COMPLEXITY   CLINICAL PRESENTATION:   Presentation: Evolving clinical presentation with changing clinical characteristics: MODERATE COMPLEXITY   CLINICAL DECISION MAKING:   Outcome Measure:    Tool Used: Mejia Balance Scale  Score:  Initial: 17/56 Most Recent: 19/56 (Date: 4/12/17 )   Interpretation of Score: Each section is scored on a 0-4 scale, 0 representing the patients inability to perform the task and 4 representing independence. The scores of each section are added together for a total score of 56. The higher the patients score, the more independent the patient is. Any score below 45 indicates increased risk for falls. Score 56 55-45 44-34 33-23 22-12 11-1 0   Modifier CH CI CJ CK CL CM CN     ? Mobility - Walking and Moving Around:     - CURRENT STATUS: CL - 60%-79% impaired, limited or restricted    - GOAL STATUS: CJ - 20%-39% impaired, limited or restricted    - D/C STATUS:  ---------------To be determined---------------    Tool Used: Lower Extremity Functional Scale (LEFS)  Score:  Initial: 15/80 Most Recent: X/80 (Date: -- )   Interpretation of Score: 20 questions each scored on a 5 point scale with 0 representing \"extreme difficulty or unable to perform\" and 4 representing \"no difficulty\". The lower the score, the greater the functional disability. 80/80 represents no disability. Minimal detectable change is 9 points. Score 80 79-63 62-48 47-32 31-16 15-1 0   Modifier  CI CJ CK CL CM CN     Medical Necessity:   · Skilled intervention continues to be required due to decreased function. Reason for Services/Other Comments:  · Patient continues to require skilled intervention due to decreased function. Use of outcome tool(s) and clinical judgement create a POC that gives a: Questionable prediction of patient's progress: MODERATE COMPLEXITY            TREATMENT:   (In addition to Assessment/Re-Assessment sessions the following treatments were rendered)  Pre-treatment Symptoms/Complaints: A little sore after last session. Dizziness is the same. No falls. Pain: Initial:   Pain Intensity 1: 0 0 Post Session:  --     Neuromuscular Re-education (41 Minutes):  Exercise/activities per grid below to improve balance, coordination, posture and proprioception.   Required minimal verbal cues to promote static and dynamic balance in standing. Balance/Vestibular Treatment:   Activity   Date  4/17/17 Date  4/24/17 Date Date Date Date   Activity/Exercise   Sets/reps/equipment Sets/reps/  equipment Sets/reps/  equipment Sets/reps/  equipment Sets/reps/  equipment Sets/reps/  equipment   Walking with head turns             Walking with head up & down             Step overs     Over 1/2 foam roll x 15 reps each leg with rail Over 1/2 foam roll x 15 reps each leg with rail       Step taps     4 inch step x 30 reps 4 inch step 2 x 10  reps B       Marching   In place x 30 reps with rail and HHA In place 2 x 10 reps with rail and HHA       Sidestepping   Standing L and R stepping x 10 reps each leg with rail Standing L and R stepping 2 x 10 reps each leg with rail       Crossovers           Trail           Walking  backwards             Tandem walking           Weaving in/out of cones             Picking up cones             Sports cord             [employer] ball           Figure 8s            Circles right/left           Walking with 360 degree turns           Spirals           Weight shifting:    Left & Right      eyes open  on floor eyes open       Weight shifting:   Forward & Backward       eyes open  on floor eyes open       Static Standing Balance      on floor eyes open and closed; Blue foams eyes open   on floor eyes open and closed; Blue foams eyes open        Standing with feet apart             Standing with feet together      On floor eyes open       Standing with feet semitandem   Blue foams eyes open  On floor eyes open; Blue foams eyes open        Standing with feet tandem           Single leg stance           Sit to stand   2 x 5 reps from mat table with no UE assist 2 x 6 reps from mat table with no UE assist       Hallpike-Tyler testing for BPPV (Benign Paroxysmal Positional Vertigo)             Dimitry exercises           Canalith Repositioning treatment/Epley Maneuver  for BPPV (Benign Paroxysmal Positional Vertigo)           Smart [employer] Training: See scanned report. Treatment/Session Assessment:    · Response to Treatment:  Patient did well. She was more willing to take hands of railing today with balance exercises. Knee pain/DJD contributes to problems with balance/mobility due to pain/buckling. · Compliance with Program/Exercises: Will assess as treatment progresses. · Recommendations/Intent for next treatment session: \"Next visit will focus on advancements to more challenging activities\".   Total Treatment Duration:  PT Patient Time In/Time Out  Time In: 5774  Time Out: 1310    Future Appointments  Date Time Provider Amol Cohen   5/1/2017 11:15 AM Fernanda Marte PT [facility]   5/8/2017 11:15 AM ELLIS Nava [facility]   5/16/2017 11:15 AM ELLIS Nava [facility]   5/22/2017 11:15 AM ELLIS Nava [facility]   5/30/2017 11:15 AM Fernanda Marte PT [facility]   6/12/2017 11:45 AM Heide Wilson MD [facility]   6/28/2017 11:30 AM [facility] ROOM 2 MECCA Marte PT

## 2017-05-01 ENCOUNTER — HOSPITAL ENCOUNTER (OUTPATIENT)
Dept: PHYSICAL THERAPY | Age: 67
Discharge: HOME | End: 2017-05-01
Payer: COMMERCIAL

## 2017-05-01 PROCEDURE — 97110 THERAPEUTIC EXERCISES: CPT

## 2017-05-01 NOTE — PROGRESS NOTES
Sebastian Hubbard  : 1950 Sebastian Hubbard  : 1950 [facility]  [address], [address], [facility] [address].  [phone]   [phone]               OUTPATIENT PHYSICAL THERAPY: Daily Note 2017    ICD-10: Treatment Diagnosis: Muscle weakness (generalized) (M62.81), Difficulty in walking, not elsewhere classified (R26.2), Dizziness and giddiness (R42)  Precautions/Allergies:   Acetaminophen; Penicillin g; and Other medication   Fall Risk Score: 2 (? 5 = High Risk)  MD Orders: evaluate and treat MEDICAL/REFERRING DIAGNOSIS:  Unspecified abnormalities of gait and mobility [R26.9]   REFERRING PHYSICIAN: Chacha Whitfield MD  RETURN PHYSICIAN APPOINTMENT: --      ASSESSMENT: 17  Ms. Roy presents with balance deficits that affect gait and function. She demonstrates general weakness in LEs, and imbalance with change of position, head or body movements. Patient is unable to lay supine due to increased dizziness, but patient reports this has been true since she was 70 years old when her eardrum burst. Patient demonstrates high fall risk and would benefit from skilled physical therapy to improve her balance, gait, strength and safety with daily activities. PROBLEM LIST (Impacting functional limitations):  1. Decreased Strength  2. Decreased Transfer Abilities  3. Decreased Ambulation Ability/Technique  4. Decreased Balance  5. Decreased [place] with Home Exercise Program INTERVENTIONS PLANNED:  1. Home Exercise Program (HEP)  2. Therapeutic Exercise/Strengthening  3. Neuromuscular re-education  4. Balance training  5. Vestibular training   TREATMENT PLAN:  Effective Dates: 2017 TO 2017. Frequency/Duration: 1-2 times a week for 8-12 weeks  GOALS: (Goals have been discussed and agreed upon with patient.)  Short-Term Functional Goals: Time Frame: 2-4 weeks  1.  Patient will demonstrate independence and compliance with home exercise program to improve balance and strength for daily activities. MET  2.    Discharge Goals: Time Frame: 8-12 weeks  1. Patient will increase her score on the Mejia Balance Scale to greater than or equal to 38/56 indicating improved safety and decreased fall risk for daily activities. 2. Patient will ambulate with least assistive device over level and unlevel surfaces without evidence of imbalance to improve safety for daily activities. 3. Patient will increase bilateral lower extremity strength to greater than or equal to 4+/5 to improve strength for functional mobility activities. 4.     Rehabilitation Potential For Stated Goals: Tete Cee Weeks's therapy, I certify that the treatment plan above will be carried out by a therapist or under their direction. Thank you for this referral,  Kelsey Calderon PT     Referring Physician Signature: Wale Wilcox MD              Date                    The information in this section was collected on 4/12/17 (except where otherwise noted). HISTORY:   History of Present Injury/Illness (Reason for Referral):  Pt has a report of balance issues ongoing for a while. Her doctor referred to physical therapy to address balance deficits and weakness in LE's. B knee pain that has been helped by injections in the past for short periods of time. Also suffers from [employer]". Has been seeing her son-in-law who is a chiropractor for the vertigo, which has helped some but has no lasting effect. Also broke R ankle many years ago and thinks that the R LE is generally weaker and less stable. At 70 years old, L ear drum burst, and has had ringing in ear since then. Balance has always been off. Recently L eardrum burst again. When lie flat, get very dizzy, since she can remember. Dizziness continues as she is laying, does not stop. Sleeps on either side or sitting up. Gets off balance if turn too fast or get up too fast, and sometimes just standing. Julianna Lombard about a year ago. No dizziness now. Hard time walking/taking a walk, getting out of tub (lifting one foot to get in/out), standing to shower. Past Medical History/Comorbidities:   Ms. Robinson Navarrete  has a past medical history of Abdominal wall hernias (11/15/2016); Allergic rhinitis (11/15/2016); Arthritis (11/15/2016); Bipolar disorder ([facility] (11/15/2016); CHF (congestive heart failure) ([facility] (11/15/2016); Depression; Diabetes mellitus type 2, controlled ([facility] (11/15/2016); Diverticulitis of colon (without mention of hemorrhage) (6/17/2013); Diverticulosis (11/15/2016); Dyslipidemia (11/15/2016); Esophageal reflux (11/15/2016); Fatigue (11/15/2016); Gout, Acute (11/15/2016); Heart failure ([facility] Hypertension; Hypertension, essential, benign (11/15/2016); Hypomagnesemia (11/15/2016); Knee pain (11/15/2016); Morbid obesity ([facility] (11/15/2016); Osteoarthritis (11/15/2016); Short bowel syndrome (11/15/2016); Tremor, hereditary, benign (11/15/2016); 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 Ms. Robinson Navarrete  has a past surgical history that includes cholecystectomy; tubal ligation; hernia repair; and colectomy. Social History/Living Environment:     house, lives with daughter  Prior Level of Function/Work/Activity:  independent  Current Medications:       Current Outpatient Prescriptions:   Krystle Chilel 364. REC. ANLOG (LANTUS SOLOSTAR SC), 100Unit/ML, 20 Units at bedtime, Disp: , Rfl:     losartan (COZAAR) 100 mg tablet, Take 100 mg by mouth daily. Instead of lisinopril, Disp: , Rfl:     loratadine (CLARITIN) 10 mg tablet, Take 10 mg by mouth daily. , Disp: , Rfl:     diclofenac (VOLTAREN) 1 % gel, 4 g. Apply 4g to knees up to 4 x per day and 2g to shoulder up to 4 x per day for OA pain, Disp: , Rfl:     traMADol (ULTRAM) 50 mg tablet, Take 50 mg by mouth.  1/2 to 1 tablet 3 x per day as needed for arthritis pain, Disp: , Rfl:     Cetirizine (ZYRTEC) 10 mg cap, Take 10 mg by mouth nightly., Disp: , Rfl:     colestipol (COLESTID) 1 gram tablet, Take 1 g by mouth. 2 tablets 2 times daily, Disp: , Rfl:     PEN NEEDLE, DIABETIC (PEN NEEDLES), 5/16\" 31G x 8MM, 1 Box 2 x per day, Disp: , Rfl:     isosorbide mononitrate ER (IMDUR) 30 mg tablet, 30 mg. 1/2 oral at bedtime, Disp: , Rfl:     MULTIVITAMIN PO, Active., Disp: , Rfl:     ASCORBATE CALCIUM (VITAMIN C PO), Active., Disp: , Rfl:     aspirin 81 mg chewable tablet, Take 81 mg by mouth daily. , Disp: , Rfl:     DIGOXIN PO, Take 0.125 mg by mouth daily. , Disp: , Rfl:     diltiazem hcl (CARDIZEM) 120 mg tablet, Take 120 mg by mouth four (4) times daily. , Disp: , Rfl:     CARVEDILOL (COREG PO), Take 6.25 mg by mouth two (2) times a day., Disp: , Rfl:     atorvastatin (LIPITOR) 20 mg tablet, Take 20 mg by mouth daily. , Disp: , Rfl:     lisinopril (PRINIVIL, ZESTRIL) 10 mg tablet, Take  by mouth daily. , Disp: , Rfl:     metFORMIN (GLUCOPHAGE) 500 mg tablet, Take 500 mg by mouth daily (with breakfast). , Disp: , Rfl:     furosemide (LASIX) 40 mg tablet, Take  by mouth daily. , Disp: , Rfl:     venlafaxine (EFFEXOR) 75 mg tablet, Take 25 mg by mouth two (2) times a day., Disp: , Rfl:     magnesium oxide (MAG-OX) 400 mg tablet, Take 400 mg by mouth daily. , Disp: , Rfl:     potassium chloride (K-DUR, KLOR-CON) 20 mEq tablet, Take  by mouth daily. , Disp: , Rfl:     multivitamins-minerals-lutein (CENTRUM SILVER) Tab, Take  by mouth., Disp: , Rfl:     b complex vitamins (SUPER B-50 COMPLEX) capsule, Take 1 Cap by mouth daily. , Disp: , Rfl:     omega-3 fatty acids-vitamin e (FISH OIL) 1,000 mg cap, Take 1 Cap by mouth., Disp: , Rfl:     Calcium-Cholecalciferol, D3, (CALCIUM 600 WITH VITAMIN D3) 600 mg(1,500mg) -400 unit cap, Take  by mouth., Disp: , Rfl:     coenzyme Q-10 (CO Q-10) 200 mg capsule, Take  by mouth daily. , Disp: , Rfl:    Date Last Reviewed:  5/1/2017     Number of Personal Factors/Comorbidities that affect the Plan of Care: 1-2: MODERATE COMPLEXITY   EXAMINATION:   Observation/Orthostatic Postural Assessment:          In standing pt tends to weight-bear more through L LE, R LE resting position is into external rotation. Hx of fractured R ankle        Walks with straight cane in R hand. Palpation:          No palpation done  ROM:          R tibial internal rotation to neutral, but not past neutral  Strength:          LE STRENGTH:  4-/5 hip flexion, 4/5 hip abduction, 4/5 hip adduction, 4/5 hip extension, 4/5 knee extension, 4/5 knee flexion, 4/5 ankle dorsiflexion, 4/5 ankle plantarflexion  Vestibular:  Smooth pursuit: WNL  VOR: WNL  VOR cancellation: WNL    SFMA Top Tier (FN = Functional and Non-painful, FP = Functional and Painful, DP = Dysfunctional and Painful, DN = Dysfunctional and Non-painful)  Cervical flexion: DN  Cervical extension: DN   Cervical rotation: L DN, R DN  Upper Extremity Pattern 1 (extension, IR): L --, R --  Upper Extremity Pattern 2 (flexion, ER): L --, R --  Multi-segmental flexion: --  Multi-segmental extension: --  Multi-segmental rotation: L --, R --  Single-Leg Stance: L --, R --  Overhead Deep Squat: --   Summary: cervical movements, especially cervical extension, results in experiencing dizziness that impairs balance     Body Structures Involved:  1. Nerves  2. Joints  3. Muscles Body Functions Affected:  1. Neuromusculoskeletal  2. Movement Related Activities and Participation Affected:  1. General Tasks and Demands  2. Mobility  3. Self Care  4. Interpersonal Interactions and Relationships   Number of elements (examined above) that affect the Plan of Care: 3: MODERATE COMPLEXITY   CLINICAL PRESENTATION:   Presentation: Evolving clinical presentation with changing clinical characteristics: MODERATE COMPLEXITY   CLINICAL DECISION MAKING:   Outcome Measure:    Tool Used: Mejia Balance Scale  Score:  Initial: 17/56 Most Recent: 19/56 (Date: 4/12/17 )   Interpretation of Score: Each section is scored on a 0-4 scale, 0 representing the patients inability to perform the task and 4 representing independence. The scores of each section are added together for a total score of 56. The higher the patients score, the more independent the patient is. Any score below 45 indicates increased risk for falls. Score 56 55-45 44-34 33-23 22-12 11-1 0   Modifier CH CI CJ CK CL CM CN     ? Mobility - Walking and Moving Around:     - CURRENT STATUS: CL - 60%-79% impaired, limited or restricted    - GOAL STATUS: CJ - 20%-39% impaired, limited or restricted    - D/C STATUS:  ---------------To be determined---------------    Tool Used: Lower Extremity Functional Scale (LEFS)  Score:  Initial: 15/80 Most Recent: X/80 (Date: -- )   Interpretation of Score: 20 questions each scored on a 5 point scale with 0 representing \"extreme difficulty or unable to perform\" and 4 representing \"no difficulty\". The lower the score, the greater the functional disability. 80/80 represents no disability. Minimal detectable change is 9 points. Score 80 79-63 62-48 47-32 31-16 15-1 0   Modifier CH CI CJ CK CL CM CN     Medical Necessity:   · Skilled intervention continues to be required due to decreased function. Reason for Services/Other Comments:  · Patient continues to require skilled intervention due to decreased function. Use of outcome tool(s) and clinical judgement create a POC that gives a: Questionable prediction of patient's progress: MODERATE COMPLEXITY            TREATMENT:   (In addition to Assessment/Re-Assessment sessions the following treatments were rendered)  Pre-treatment Symptoms/Complaints: Foot MD wants to work on R ankle strength due to mild foot drop. Knees \"going out on me\" this morning. .   Pain: Initial:     0 Post Session:  --     Neuromuscular Re-education ( ):  Exercise/activities per grid below to improve balance, coordination, posture and proprioception.   Required minimal verbal cues to promote static and dynamic balance in standing. Balance/Vestibular Treatment:   Activity   Date  4/17/17 Date  4/24/17 Date     Date Date Date   Activity/Exercise   Sets/reps/equipment Sets/reps/  equipment Sets/reps/  equipment Sets/reps/  equipment Sets/reps/  equipment Sets/reps/  equipment   Walking with head turns             Walking with head up & down             Step overs     Over 1/2 foam roll x 15 reps each leg with rail Over 1/2 foam roll x 15 reps each leg with rail       Step taps     4 inch step x 30 reps 4 inch step 2 x 10  reps B       Marching   In place x 30 reps with rail and HHA In place 2 x 10 reps with rail and HHA       Sidestepping   Standing L and R stepping x 10 reps each leg with rail Standing L and R stepping 2 x 10 reps each leg with rail       Crossovers           [place]           Walking  backwards             Tandem walking           Weaving in/out of cones             Picking up cones             Sports cord             [employer] ball           Figure 8s            Circles right/left           Walking with 360 degree turns           Spirals           Weight shifting:    Left & Right      eyes open  on floor eyes open       Weight shifting:   Forward & Backward       eyes open  on floor eyes open       Static Standing Balance      on floor eyes open and closed; Blue foams eyes open   on floor eyes open and closed; Blue foams eyes open        Standing with feet apart             Standing with feet together      On floor eyes open       Standing with feet semitandem   Blue foams eyes open  On floor eyes open; Blue foams eyes open        Standing with feet tandem           Single leg stance           Sit to stand   2 x 5 reps from mat table with no UE assist 2 x 6 reps from mat table with no UE assist       Hallpike-Franco testing for BPPV (Benign Paroxysmal Positional Vertigo)             Derrick-Carmenza exercises           Canalith Repositioning treatment/Epley Maneuver  for BPPV (Benign Paroxysmal Positional Vertigo)           Smart Equitest Training: See scanned report. Therapeutic Exercise: (30 Minutes):  Exercises per grid below to improve mobility, strength and balance. Required minimal verbal cues to promote proper body alignment, promote proper body posture and promote proper body mechanics. Progressed resistance and repetitions as indicated. Date:  5/1/17 Date:   Date:     Activity/Exercise Parameters Parameters Parameters   Ankle dorsiflexion sitting X 20 reps B, HEP     Ankle plantarflexion sitting X 20 reps B, HEP     Ankle circles sitting. X 20 reps B, HEP     Quad sets X 5 reps B, HEP     Straight leg raises X 5 reps B, HEP     Walking with RW Patient walked with RW to see how knees felt. Less buckling noted with RW vs SPC. Treatment/Session Assessment:    · Response to Treatment:  Patient was having a difficult time walking today with her SPC, as B knees were having episodes of buckling due to OA. Showed patient how to walk with 2 wheel RW, to help with balance and to take weight off knees. Gave patient an order form for walker, and she will see her MD on Wednesday to sign it. Gave patient HEP for ankle strengthening and quad strengthening. Talked to patient about pool therapy as a possibility to protect knees, and to work on strength and balance. Patient verbalizes she is fearful of the water and can't swim, but would be ok if was just in waist deep water. Patient will think about that. Did not do any balance exercises today due to knees buckling frequently with weight bearing. Asked patient to take her time and be careful. Recommended that patient talk to MD on Wednesday about being referred to Orthopedic MD about knees. · Compliance with Program/Exercises: compliant  · Recommendations/Intent for next treatment session: \"Next visit will focus on advancements to more challenging activities\".   Total Treatment Duration:  PT Patient Time In/Time Out  Time In: 1115  Time Out: 1155    Future Appointments  Date Time Provider Amol Vicki   5/8/2017 11:15 AM Jozef Ramirez, PT [facility]   5/16/2017 11:15 [facility]   5/22/2017 11:15 [facility]   5/30/2017 11:15 AM Jozef Ramirez PT [facility]   6/12/2017 11:45 AM Danitza Bates MD [facility]   6/28/2017 11:30 AM [facility] ROOM 2 MAGNOLIAERMTAZ Ramirez, PT

## 2017-05-08 ENCOUNTER — HOSPITAL ENCOUNTER (OUTPATIENT)
Dept: PHYSICAL THERAPY | Age: 67
Discharge: HOME | End: 2017-05-08
Payer: COMMERCIAL

## 2017-05-08 NOTE — PROGRESS NOTES
Patient did not receive PT this AM due to cancelled due to not feeling well.   Chandu Barrientos, PT  5/8/2017

## 2017-05-16 ENCOUNTER — HOSPITAL ENCOUNTER (OUTPATIENT)
Dept: PHYSICAL THERAPY | Age: 67
Discharge: HOME | End: 2017-05-16
Payer: COMMERCIAL

## 2017-05-16 PROCEDURE — 97110 THERAPEUTIC EXERCISES: CPT

## 2017-05-16 PROCEDURE — 97112 NEUROMUSCULAR REEDUCATION: CPT

## 2017-05-16 NOTE — PROGRESS NOTES
Monse Mccord  : 1950 Monse Mccord  : 1950 [facility] [place] [address], [place] [address].  [phone]   [phone]               OUTPATIENT PHYSICAL THERAPY: Daily Note 2017    ICD-10: Treatment Diagnosis: Muscle weakness (generalized) (M62.81), Difficulty in walking, not elsewhere classified (R26.2), Dizziness and giddiness (R42)  Precautions/Allergies:   Acetaminophen; Penicillin g; and Other medication   Fall Risk Score: 2 (? 5 = High Risk)  MD Orders: evaluate and treat MEDICAL/REFERRING DIAGNOSIS:  Unspecified abnormalities of gait and mobility [R26.9]   REFERRING PHYSICIAN: Lea Taylor MD  RETURN PHYSICIAN APPOINTMENT: --      ASSESSMENT: 17  Ms. Roy presents with balance deficits that affect gait and function. She demonstrates general weakness in LEs, and imbalance with change of position, head or body movements. Patient is unable to lay supine due to increased dizziness, but patient reports this has been true since she was 70 years old when her eardrum burst. Patient demonstrates high fall risk and would benefit from skilled physical therapy to improve her balance, gait, strength and safety with daily activities. PROBLEM LIST (Impacting functional limitations):  1. Decreased Strength  2. Decreased Transfer Abilities  3. Decreased Ambulation Ability/Technique  4. Decreased Balance  5. Decreased [place] with Home Exercise Program INTERVENTIONS PLANNED:  1. Home Exercise Program (HEP)  2. Therapeutic Exercise/Strengthening  3. Neuromuscular re-education  4. Balance training  5. Vestibular training   TREATMENT PLAN:  Effective Dates: 2017 TO 2017. Frequency/Duration: 1-2 times a week for 8-12 weeks  GOALS: (Goals have been discussed and agreed upon with patient.)  Short-Term Functional Goals: Time Frame: 2-4 weeks  1.  Patient will demonstrate independence and compliance with home exercise program to improve balance and strength for daily activities. MET  2.    Discharge Goals: Time Frame: 8-12 weeks  1. Patient will increase her score on the Mejia Balance Scale to greater than or equal to 38/56 indicating improved safety and decreased fall risk for daily activities. 2. Patient will ambulate with least assistive device over level and unlevel surfaces without evidence of imbalance to improve safety for daily activities. 3. Patient will increase bilateral lower extremity strength to greater than or equal to 4+/5 to improve strength for functional mobility activities. 4.     Rehabilitation Potential For Stated Goals: Tete Cee Weeks's therapy, I certify that the treatment plan above will be carried out by a therapist or under their direction. Thank you for this referral,  Chandu Barrientos PT     Referring Physician Signature: Lia Thornton MD              Date                    The information in this section was collected on 4/12/17 (except where otherwise noted). HISTORY:   History of Present Injury/Illness (Reason for Referral):  Pt has a report of balance issues ongoing for a while. Her doctor referred to physical therapy to address balance deficits and weakness in LE's. B knee pain that has been helped by injections in the past for short periods of time. Also suffers from [employer]". Has been seeing her son-in-law who is a chiropractor for the vertigo, which has helped some but has no lasting effect. Also broke R ankle many years ago and thinks that the R LE is generally weaker and less stable. At 70 years old, L ear drum burst, and has had ringing in ear since then. Balance has always been off. Recently L eardrum burst again. When lie flat, get very dizzy, since she can remember. Dizziness continues as she is laying, does not stop. Sleeps on either side or sitting up. Gets off balance if turn too fast or get up too fast, and sometimes just standing.  Fermin Mendez about a year ago. No dizziness now. Hard time walking/taking a walk, getting out of tub (lifting one foot to get in/out), standing to shower. Past Medical History/Comorbidities:   Ms. Santo Etienne  has a past medical history of Abdominal wall hernias (11/15/2016); Allergic rhinitis (11/15/2016); Arthritis (11/15/2016); Bipolar disorder ([facility] (11/15/2016); CHF (congestive heart failure) ([facility] (11/15/2016); Depression; Diabetes mellitus type 2, controlled ([facility] (11/15/2016); Diverticulitis of colon (without mention of hemorrhage) (6/17/2013); Diverticulosis (11/15/2016); Dyslipidemia (11/15/2016); Esophageal reflux (11/15/2016); Fatigue (11/15/2016); Gout, Acute (11/15/2016); [facility] failure ([facility] Hypertension; Hypertension, essential, benign (11/15/2016); Hypomagnesemia (11/15/2016); Knee pain (11/15/2016); Morbid obesity (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 Ms. Santo Etienne  has a past surgical history that includes cholecystectomy; tubal ligation; hernia repair; and colectomy. Social History/Living Environment:     house, lives with daughter  Prior Level of Function/Work/Activity:  independent  Current Medications:       Current Outpatient Prescriptions:   Krystle Chilel 364. REC. ANLOG (LANTUS SOLOSTAR SC), 100Unit/ML, 20 Units at bedtime, Disp: , Rfl:     losartan (COZAAR) 100 mg tablet, Take 100 mg by mouth daily. Instead of lisinopril, Disp: , Rfl:     loratadine (CLARITIN) 10 mg tablet, Take 10 mg by mouth daily. , Disp: , Rfl:     diclofenac (VOLTAREN) 1 % gel, 4 g. Apply 4g to knees up to 4 x per day and 2g to shoulder up to 4 x per day for OA pain, Disp: , Rfl:     traMADol (ULTRAM) 50 mg tablet, Take 50 mg by mouth.  1/2 to 1 tablet 3 x per day as needed for arthritis pain, Disp: , Rfl:     Cetirizine (ZYRTEC) 10 mg cap, Take 10 mg by mouth nightly., Disp: , Rfl:     colestipol (COLESTID) 1 gram tablet, Take 1 g by mouth. 2 tablets 2 times daily, Disp: , Rfl:     PEN NEEDLE, DIABETIC (PEN NEEDLES), 5/16\" 31G x 8MM, 1 Box 2 x per day, Disp: , Rfl:     isosorbide mononitrate ER (IMDUR) 30 mg tablet, 30 mg. 1/2 oral at bedtime, Disp: , Rfl:     MULTIVITAMIN PO, Active., Disp: , Rfl:     ASCORBATE CALCIUM (VITAMIN C PO), Active., Disp: , Rfl:     aspirin 81 mg chewable tablet, Take 81 mg by mouth daily. , Disp: , Rfl:     DIGOXIN PO, Take 0.125 mg by mouth daily. , Disp: , Rfl:     diltiazem hcl (CARDIZEM) 120 mg tablet, Take 120 mg by mouth four (4) times daily. , Disp: , Rfl:     CARVEDILOL (COREG PO), Take 6.25 mg by mouth two (2) times a day., Disp: , Rfl:     atorvastatin (LIPITOR) 20 mg tablet, Take 20 mg by mouth daily. , Disp: , Rfl:     lisinopril (PRINIVIL, ZESTRIL) 10 mg tablet, Take  by mouth daily. , Disp: , Rfl:     metFORMIN (GLUCOPHAGE) 500 mg tablet, Take 500 mg by mouth daily (with breakfast). , Disp: , Rfl:     furosemide (LASIX) 40 mg tablet, Take  by mouth daily. , Disp: , Rfl:     venlafaxine (EFFEXOR) 75 mg tablet, Take 25 mg by mouth two (2) times a day., Disp: , Rfl:     magnesium oxide (MAG-OX) 400 mg tablet, Take 400 mg by mouth daily. , Disp: , Rfl:     potassium chloride (K-DUR, KLOR-CON) 20 mEq tablet, Take  by mouth daily. , Disp: , Rfl:     multivitamins-minerals-lutein (CENTRUM SILVER) Tab, Take  by mouth., Disp: , Rfl:     b complex vitamins (SUPER B-50 COMPLEX) capsule, Take 1 Cap by mouth daily. , Disp: , Rfl:     omega-3 fatty acids-vitamin e (FISH OIL) 1,000 mg cap, Take 1 Cap by mouth., Disp: , Rfl:     Calcium-Cholecalciferol, D3, (CALCIUM 600 WITH VITAMIN D3) 600 mg(1,500mg) -400 unit cap, Take  by mouth., Disp: , Rfl:     coenzyme Q-10 (CO Q-10) 200 mg capsule, Take  by mouth daily. , Disp: , Rfl:    Date Last Reviewed:  5/16/2017     Number of Personal Factors/Comorbidities that affect the Plan of Care: 1-2: MODERATE COMPLEXITY   EXAMINATION:   Observation/Orthostatic Postural Assessment:          In standing pt tends to weight-bear more through L LE, R LE resting position is into external rotation. Hx of fractured R ankle        Walks with straight cane in R hand. Palpation:          No palpation done  ROM:          R tibial internal rotation to neutral, but not past neutral  Strength:          LE STRENGTH:  4-/5 hip flexion, 4/5 hip abduction, 4/5 hip adduction, 4/5 hip extension, 4/5 knee extension, 4/5 knee flexion, 4/5 ankle dorsiflexion, 4/5 ankle plantarflexion  Vestibular:  Smooth pursuit: WNL  VOR: WNL  VOR cancellation: WNL    SFMA Top Tier (FN = Functional and Non-painful, FP = Functional and Painful, DP = Dysfunctional and Painful, DN = Dysfunctional and Non-painful)  Cervical flexion: DN  Cervical extension: DN   Cervical rotation: L DN, R DN  Upper Extremity Pattern 1 (extension, IR): L --, R --  Upper Extremity Pattern 2 (flexion, ER): L --, R --  Multi-segmental flexion: --  Multi-segmental extension: --  Multi-segmental rotation: L --, R --  Single-Leg Stance: L --, R --  Overhead Deep Squat: --   Summary: cervical movements, especially cervical extension, results in experiencing dizziness that impairs balance     Body Structures Involved:  1. Nerves  2. Joints  3. Muscles Body Functions Affected:  1. Neuromusculoskeletal  2. Movement Related Activities and Participation Affected:  1. General Tasks and Demands  2. Mobility  3. Self Care  4. Interpersonal Interactions and Relationships   Number of elements (examined above) that affect the Plan of Care: 3: MODERATE COMPLEXITY   CLINICAL PRESENTATION:   Presentation: Evolving clinical presentation with changing clinical characteristics: MODERATE COMPLEXITY   CLINICAL DECISION MAKING:   Outcome Measure:    Tool Used: Mejia Balance Scale  Score:  Initial: 17/56 Most Recent: 19/56 (Date: 4/12/17 )   Interpretation of Score: Each section is scored on a 0-4 scale, 0 representing the patients inability to perform the task and 4 representing independence. The scores of each section are added together for a total score of 56. The higher the patients score, the more independent the patient is. Any score below 45 indicates increased risk for falls. Score 56 55-45 44-34 33-23 22-12 11-1 0   Modifier CH CI CJ CK CL CM CN     ? Mobility - Walking and Moving Around:     - CURRENT STATUS: CL - 60%-79% impaired, limited or restricted    - GOAL STATUS: CJ - 20%-39% impaired, limited or restricted    - D/C STATUS:  ---------------To be determined---------------    Tool Used: Lower Extremity Functional Scale (LEFS)  Score:  Initial: 15/80 Most Recent: X/80 (Date: -- )   Interpretation of Score: 20 questions each scored on a 5 point scale with 0 representing \"extreme difficulty or unable to perform\" and 4 representing \"no difficulty\". The lower the score, the greater the functional disability. 80/80 represents no disability. Minimal detectable change is 9 points. Score 80 79-63 62-48 47-32 31-16 15-1 0   Modifier CH CI CJ CK CL CM CN     Medical Necessity:   · Skilled intervention continues to be required due to decreased function. Reason for Services/Other Comments:  · Patient continues to require skilled intervention due to decreased function. Use of outcome tool(s) and clinical judgement create a POC that gives a: Questionable prediction of patient's progress: MODERATE COMPLEXITY            TREATMENT:   (In addition to Assessment/Re-Assessment sessions the following treatments were rendered)  Pre-treatment Symptoms/Complaints: Haven't been able to get RW yet. My R knee has been really sore. No falls. Pain: Initial:     0 Post Session:  --     Neuromuscular Re-education (20 Minutes):  Exercise/activities per grid below to improve balance, coordination, posture and proprioception.   Required minimal verbal cues to promote static and dynamic balance in standing. Balance/Vestibular Treatment:   Activity   Date  4/17/17 Date  4/24/17 Date  5/16/17     Date Date Date   Activity/Exercise   Sets/reps/equipment Sets/reps/  equipment Sets/reps/  equipment Sets/reps/  equipment Sets/reps/  equipment Sets/reps/  equipment   Walking with head turns             Walking with head up & down             Step overs     Over 1/2 foam roll x 15 reps each leg with rail Over 1/2 foam roll x 15 reps each leg with rail l      Step taps     4 inch step x 30 reps 4 inch step 2 x 10  reps B 4 inch step 2 x 10  reps B      Marching   In place x 30 reps with rail and HHA In place 2 x 10 reps with rail and HHA In place 2 x 10 reps with rail and HHA      Sidestepping   Standing L and R stepping x 10 reps each leg with rail Standing L and R stepping 2 x 10 reps each leg with rail Standing L and R stepping 2 x 10 reps each leg with rail      Crossovers           Connell           Walking  backwards             Tandem walking           Weaving in/out of cones             Picking up cones             Sports cord             [employer] ball           Figure 8s            Circles right/left           Walking with 360 degree turns           Spirals           Weight shifting:    Left & Right      eyes open  on floor eyes open On blue foams eyes open      Weight shifting:   Forward & Backward       eyes open  on floor eyes open On blue foams eyes open      Static Standing Balance      on floor eyes open and closed; Blue foams eyes open   on floor eyes open and closed; Blue foams eyes open   on floor eyes open and closed; Blue foams eyes open       Standing with feet apart             Standing with feet together      On floor eyes open On floor eyes open      Standing with feet semitandem   Blue foams eyes open  On floor eyes open; Blue foams eyes open  On floor eyes open; Blue foams eyes open       Standing with feet tandem           Single leg stance           Sit to stand   2 x 5 reps from mat table with no UE assist 2 x 6 reps from mat table with no UE assist       Hallpike-Coeur D Alene testing for BPPV (Benign Paroxysmal Positional Vertigo)             Meza-Daroff exercises           Canalith Repositioning treatment/Epley Maneuver  for BPPV (Benign Paroxysmal Positional Vertigo)           Smart [employer] Training: See scanned report. Therapeutic Exercise: (15 Minutes):  Exercises per grid below to improve mobility, strength and balance. Required minimal verbal cues to promote proper body alignment, promote proper body posture and promote proper body mechanics. Progressed resistance and repetitions as indicated. Date:  5/1/17 Date:  5/16/17   Date:     Activity/Exercise Parameters Parameters Parameters   Ankle dorsiflexion sitting X 20 reps B, HEP X 20 reps B    Ankle plantarflexion sitting X 20 reps B, HEP X 20 reps B    Ankle inversion and eversion in sitting  X 20 reps B    Ankle circles sitting. X 20 reps B, HEP     Quad sets X 5 reps B, HEP     Straight leg raises X 5 reps B, HEP     Walking with RW Patient walked with RW to see how knees felt. Less buckling noted with RW vs SPC. Patient walked with RW to see how knees felt. Less buckling noted with RW vs SPC. Long arc quads  2# B, 2 x 10 Reps B            Treatment/Session Assessment:    · Response to Treatment:  Patient did fairly well with therapy. Still recommend RW and patient is going to make some more calls to find one. · Compliance with Program/Exercises: compliant  · Recommendations/Intent for next treatment session: \"Next visit will focus on advancements to more challenging activities\".   Total Treatment Duration:  PT Patient Time In/Time Out  Time In: 1124  Time Out: 1200    Future Appointments  Date Time Provider Amol Cohen   5/22/2017 11:15 AM Chioma Perez PT [email or web address]   5/30/2017 11:15 AM [facility]   5/31/2017 11:20 AM Lorenzo Hussein  [facility] [place] [facility]   6/8/2017 10:30 AM Luana Lewis, PT [facility]   6/12/2017 11:45 AM Jocelin Park MD [facility]   6/14/2017 11:15 AM Harriett Feliciano, PT [facility]   6/20/2017 11:15 AM Harriett Feliciano, PT [facility]   6/28/2017 11:30 AM S[facility] ROOM 2 ERMAM E   7/17/2017 12:00 PM Jocelin Park MD [address], PT

## 2017-05-22 ENCOUNTER — HOSPITAL ENCOUNTER (OUTPATIENT)
Dept: PHYSICAL THERAPY | Age: 67
Discharge: HOME | End: 2017-05-22
Payer: COMMERCIAL

## 2017-05-30 ENCOUNTER — HOSPITAL ENCOUNTER (OUTPATIENT)
Dept: PHYSICAL THERAPY | Age: 67
Discharge: HOME | End: 2017-05-30
Payer: COMMERCIAL

## 2017-05-30 NOTE — PROGRESS NOTES
Patient called and cancelled today's appointment and all future appointments. She stated she was watching grandchildren and could not make it to therapy appointments.

## 2017-06-08 ENCOUNTER — APPOINTMENT (OUTPATIENT)
Dept: PHYSICAL THERAPY | Age: 67
End: 2017-06-08

## 2017-06-14 ENCOUNTER — APPOINTMENT (OUTPATIENT)
Dept: PHYSICAL THERAPY | Age: 67
End: 2017-06-14

## 2017-06-20 ENCOUNTER — APPOINTMENT (OUTPATIENT)
Dept: PHYSICAL THERAPY | Age: 67
End: 2017-06-20

## 2017-06-28 ENCOUNTER — HOSPITAL ENCOUNTER (OUTPATIENT)
Dept: MAMMOGRAPHY | Age: 67
Discharge: HOME | End: 2017-06-28
Attending: INTERNAL MEDICINE
Payer: COMMERCIAL

## 2017-06-28 DIAGNOSIS — Z12.31 VISIT FOR SCREENING MAMMOGRAM: ICD-10-CM

## 2017-06-28 PROCEDURE — 77067 SCR MAMMO BI INCL CAD: CPT

## 2017-07-05 NOTE — PROGRESS NOTES
David Fu  : 1950 David Fu  : 1950 [facility] at [facility], [address] [address],[address], [facility] 91.  [phone]   [phone]               OUTPATIENT PHYSICAL THERAPY: Discontinuation 2017    ICD-10: Treatment Diagnosis: Muscle weakness (generalized) (M62.81), Difficulty in walking, not elsewhere classified (R26.2), Dizziness and giddiness (R42)  Precautions/Allergies:   Acetaminophen; Penicillin g; and Other medication   Fall Risk Score: 2 (? 5 = High Risk)  MD Orders: evaluate and treat MEDICAL/REFERRING DIAGNOSIS:  Unspecified abnormalities of gait and mobility [R26.9]   REFERRING PHYSICIAN: Anastasiya Mujica MD  RETURN PHYSICIAN APPOINTMENT: --      Discontinuation:   Patient attended 5 PT sessions from 17 to 17, no showed on 17, and cancelled the last appointment on 17. Patient called on 17 and cancelled the rest of her appointments, stating she had to take care of her grandchildren and wouldn't be able to come to therapy. We will discharge patient at this time per patient request. Thank you. PROBLEM LIST (Impacting functional limitations):  1. Decreased Strength  2. Decreased Transfer Abilities  3. Decreased Ambulation Ability/Technique  4. Decreased Balance  5. Decreased [place] with Home Exercise Program INTERVENTIONS PLANNED:  1. Home Exercise Program (HEP)  2. Therapeutic Exercise/Strengthening  3. Neuromuscular re-education  4. Balance training  5. Vestibular training   TREATMENT PLAN:  Effective Dates: 2017 TO 2017. Frequency/Duration: discharge  GOALS: (Goals have been discussed and agreed upon with patient.)  Short-Term Functional Goals: Time Frame: 2-4 weeks  1. Patient will demonstrate independence and compliance with home exercise program to improve balance and strength for daily activities. MET  2.    Discharge Goals: Time Frame: 8-12 weeks  1.  Patient will increase her score on the Mejia Balance Scale to greater than or equal to 38/56 indicating improved safety and decreased fall risk for daily activities. Unable to reassess  2. Patient will ambulate with least assistive device over level and unlevel surfaces without evidence of imbalance to improve safety for daily activities. Unable to reassess  3. Patient will increase bilateral lower extremity strength to greater than or equal to 4+/5 to improve strength for functional mobility activities. Unable to reassess  4. Rehabilitation Potential For Stated Goals: Fair              The information in this section was collected on 4/12/17 (except where otherwise noted). HISTORY:   History of Present Injury/Illness (Reason for Referral):  Pt has a report of balance issues ongoing for a while. Her doctor referred to physical therapy to address balance deficits and weakness in LE's. B knee pain that has been helped by injections in the past for short periods of time. Also suffers from [employer]". Has been seeing her son-in-law who is a chiropractor for the vertigo, which has helped some but has no lasting effect. Also broke R ankle many years ago and thinks that the R LE is generally weaker and less stable. At 70 years old, L ear drum burst, and has had ringing in ear since then. Balance has always been off. Recently L eardrum burst again. When lie flat, get very dizzy, since she can remember. Dizziness continues as she is laying, does not stop. Sleeps on either side or sitting up. Gets off balance if turn too fast or get up too fast, and sometimes just standing. Yovannyes Litzy about a year ago. No dizziness now. Hard time walking/taking a walk, getting out of tub (lifting one foot to get in/out), standing to shower. Past Medical History/Comorbidities:   Ms. Karolyn Acosta  has a past medical history of Abdominal wall hernias (11/15/2016); Allergic rhinitis (11/15/2016); Arthritis (11/15/2016);  Bipolar disorder ([facility] (11/15/2016); CHF (congestive heart failure) ([facility] (11/15/2016); Depression; Diabetes mellitus type 2, controlled ([facility] (11/15/2016); Diverticulitis of colon (without mention of hemorrhage) (6/17/2013); Diverticulosis (11/15/2016); Dyslipidemia (11/15/2016); Esophageal reflux (11/15/2016); Fatigue (11/15/2016); Gout, Acute (11/15/2016); Heart failure ([facility] Hypertension; Hypertension, essential, benign (11/15/2016); Hypomagnesemia (11/15/2016); Knee pain (11/15/2016); Morbid obesity ([facility] (11/15/2016); Osteoarthritis (11/15/2016); Short bowel syndrome (11/15/2016); Tremor, hereditary, benign (11/15/2016); 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 Ms. Haylee Stevens  has a past surgical history that includes cholecystectomy; tubal ligation; hernia repair; colectomy; and breast biopsy (Right, 2005). Social History/Living Environment:     house, lives with daughter  Prior Level of Function/Work/Activity:  independent  Current Medications:       Current Outpatient Prescriptions:     Walker misc, 1 Units by Does Not Apply route daily. , Disp: 1 Each, Rfl: 0    exenatide (BYETTA) 5 mcg/dose (250 mcg/mL) 1.2 mL injection, 5 mcg by SubCUTAneous route two (2) times a day., Disp: , Rfl:     divalproex DR (DEPAKOTE) 500 mg tablet, Take 1 Tab by mouth two (2) times a day., Disp: , Rfl:     metFORMIN (GLUMETZA ER) 500 mg TG24 24 hour tablet, Take 500 mg by mouth two (2) times a day., Disp: 60 Tab, Rfl: 2    INSULIN GLARGINE,HUM. REC. ANLOG (LANTUS SOLOSTAR SC), 100Unit/ML, 20 Units at bedtime, Disp: , Rfl:     losartan (COZAAR) 100 mg tablet, Take 100 mg by mouth daily. Instead of lisinopril, Disp: , Rfl:     loratadine (CLARITIN) 10 mg tablet, Take 10 mg by mouth daily. , Disp: , Rfl:     diclofenac (VOLTAREN) 1 % gel, 4 g. Apply 4g to knees up to 4 x per day and 2g to shoulder up to 4 x per day for OA pain, Disp: , Rfl:     traMADol (ULTRAM) 50 mg tablet, Take 50 mg by mouth.  1/2 to 1 tablet 3 x per day as needed for arthritis pain, Disp: , Rfl:     Cetirizine (ZYRTEC) 10 mg cap, Take 10 mg by mouth nightly., Disp: , Rfl:     colestipol (COLESTID) 1 gram tablet, Take 1 g by mouth. 2 tablets 2 times daily, Disp: , Rfl:     PEN NEEDLE, DIABETIC (PEN NEEDLES), 5/16\" 31G x 8MM, 1 Box 2 x per day, Disp: , Rfl:     isosorbide mononitrate ER (IMDUR) 30 mg tablet, 30 mg. 1/2 oral at bedtime, Disp: , Rfl:     MULTIVITAMIN PO, Active., Disp: , Rfl:     ASCORBATE CALCIUM (VITAMIN C PO), Active., Disp: , Rfl:     aspirin 81 mg chewable tablet, Take 81 mg by mouth daily. , Disp: , Rfl:     DIGOXIN PO, Take 0.125 mg by mouth daily. , Disp: , Rfl:     diltiazem hcl (CARDIZEM) 120 mg tablet, Take 120 mg by mouth four (4) times daily. , Disp: , Rfl:     CARVEDILOL (COREG PO), Take 6.25 mg by mouth two (2) times a day., Disp: , Rfl:     atorvastatin (LIPITOR) 20 mg tablet, Take 20 mg by mouth daily. , Disp: , Rfl:     lisinopril (PRINIVIL, ZESTRIL) 10 mg tablet, Take  by mouth daily. , Disp: , Rfl:     furosemide (LASIX) 40 mg tablet, Take  by mouth daily. , Disp: , Rfl:     venlafaxine (EFFEXOR) 75 mg tablet, Take 25 mg by mouth two (2) times a day., Disp: , Rfl:     magnesium oxide (MAG-OX) 400 mg tablet, Take 400 mg by mouth daily. , Disp: , Rfl:     potassium chloride (K-DUR, KLOR-CON) 20 mEq tablet, Take  by mouth daily. , Disp: , Rfl:     multivitamins-minerals-lutein (CENTRUM SILVER) Tab, Take  by mouth., Disp: , Rfl:     b complex vitamins (SUPER B-50 COMPLEX) capsule, Take 1 Cap by mouth daily. , Disp: , Rfl:     omega-3 fatty acids-vitamin e (FISH OIL) 1,000 mg cap, Take 1 Cap by mouth., Disp: , Rfl:     Calcium-Cholecalciferol, D3, (CALCIUM 600 WITH VITAMIN D3) 600 mg(1,500mg) -400 unit cap, Take  by mouth., Disp: , Rfl:     coenzyme Q-10 (CO Q-10) 200 mg capsule, Take  by mouth daily. , Disp: , Rfl:    Date Last Reviewed:  4/12/2017     Number of Personal Factors/Comorbidities that affect the Plan of Care: 1-2: MODERATE COMPLEXITY   EXAMINATION:   Observation/Orthostatic Postural Assessment:          In standing pt tends to weight-bear more through L LE, R LE resting position is into external rotation. Hx of fractured R ankle        Walks with straight cane in R hand. Palpation:          No palpation done  ROM:          R tibial internal rotation to neutral, but not past neutral  Strength:          LE STRENGTH:  4-/5 hip flexion, 4/5 hip abduction, 4/5 hip adduction, 4/5 hip extension, 4/5 knee extension, 4/5 knee flexion, 4/5 ankle dorsiflexion, 4/5 ankle plantarflexion  Vestibular:  Smooth pursuit: WNL  VOR: WNL  VOR cancellation: WNL    SFMA Top Tier (FN = Functional and Non-painful, FP = Functional and Painful, DP = Dysfunctional and Painful, DN = Dysfunctional and Non-painful)  Cervical flexion: DN  Cervical extension: DN   Cervical rotation: L DN, R DN  Upper Extremity Pattern 1 (extension, IR): L --, R --  Upper Extremity Pattern 2 (flexion, ER): L --, R --  Multi-segmental flexion: --  Multi-segmental extension: --  Multi-segmental rotation: L --, R --  Single-Leg Stance: L --, R --  Overhead Deep Squat: --   Summary: cervical movements, especially cervical extension, results in experiencing dizziness that impairs balance     Body Structures Involved:  1. Nerves  2. Joints  3. Muscles Body Functions Affected:  1. Neuromusculoskeletal  2. Movement Related Activities and Participation Affected:  1. General Tasks and Demands  2. Mobility  3. Self Care  4. Interpersonal Interactions and Relationships   Number of elements (examined above) that affect the Plan of Care: 3: MODERATE COMPLEXITY   CLINICAL PRESENTATION:   Presentation: Evolving clinical presentation with changing clinical characteristics: MODERATE COMPLEXITY   CLINICAL DECISION MAKING:   Outcome Measure:    Tool Used: Mejia Balance Scale  Score:  Initial: 17/56 Most Recent: 19/56 (Date: 4/12/17 )   Interpretation of Score: Each section is scored on a 0-4 scale, 0 representing the patients inability to perform the task and 4 representing independence. The scores of each section are added together for a total score of 56. The higher the patients score, the more independent the patient is. Any score below 45 indicates increased risk for falls. Score 56 55-45 44-34 33-23 22-12 11-1 0   Modifier CH CI CJ CK CL CM CN     ? Mobility - Walking and Moving Around:     - CURRENT STATUS: CL - 60%-79% impaired, limited or restricted    - GOAL STATUS: CJ - 20%-39% impaired, limited or restricted    - D/C STATUS:  unable to reassess    Tool Used: Lower Extremity Functional Scale (LEFS)  Score:  Initial: 15/80 Most Recent: X/80 (Date: -- )   Interpretation of Score: 20 questions each scored on a 5 point scale with 0 representing \"extreme difficulty or unable to perform\" and 4 representing \"no difficulty\". The lower the score, the greater the functional disability. 80/80 represents no disability. Minimal detectable change is 9 points. Score 80 79-63 62-48 47-32 31-16 15-1 0   Modifier CH CI CJ CK CL CM CN     Medical Necessity:   · Skilled intervention continues to be required due to decreased function. Reason for Services/Other Comments:  · Patient continues to require skilled intervention due to decreased function.    Use of outcome tool(s) and clinical judgement create a POC that gives a: Questionable prediction of patient's progress: MODERATE COMPLEXITY            TREATMENT:   (In addition to Assessment/Re-Assessment sessions the following treatments were rendered)        Karin Kaufman, PT

## 2017-07-18 PROBLEM — F33.1 MODERATE EPISODE OF RECURRENT MAJOR DEPRESSIVE DISORDER (HCC): Status: ACTIVE | Noted: 2017-07-18

## 2017-08-02 PROBLEM — F33.1 MODERATE EPISODE OF RECURRENT MAJOR DEPRESSIVE DISORDER (HCC): Status: RESOLVED | Noted: 2017-07-18 | Resolved: 2017-08-02

## 2017-08-10 PROBLEM — Z23 ENCOUNTER FOR IMMUNIZATION: Status: ACTIVE | Noted: 2017-08-10

## 2017-08-10 PROBLEM — Z13.39 SCREENING FOR ALCOHOLISM: Status: ACTIVE | Noted: 2017-08-10

## 2017-12-18 PROBLEM — F90.9 ATTENTION DEFICIT HYPERACTIVITY DISORDER (ADHD): Status: ACTIVE | Noted: 2017-12-18

## 2017-12-18 PROBLEM — F31.62 BIPOLAR DISORDER, CURRENT EPISODE MIXED, MODERATE (HCC): Status: ACTIVE | Noted: 2017-12-18

## 2017-12-18 PROBLEM — F43.21 ADJUSTMENT DISORDER WITH DEPRESSED MOOD: Status: ACTIVE | Noted: 2017-12-18

## 2018-08-25 ENCOUNTER — HOSPITAL ENCOUNTER (OUTPATIENT)
Dept: MAMMOGRAPHY | Age: 68
Discharge: HOME | End: 2018-08-25
Attending: NURSE PRACTITIONER
Payer: MEDICARE

## 2018-08-25 DIAGNOSIS — Z12.39 SCREENING BREAST EXAMINATION: ICD-10-CM

## 2018-08-25 PROCEDURE — 77067 SCR MAMMO BI INCL CAD: CPT

## 2018-09-17 ENCOUNTER — HOSPITAL ENCOUNTER (OUTPATIENT)
Dept: MAMMOGRAPHY | Age: 68
Discharge: HOME | End: 2018-09-17
Attending: NURSE PRACTITIONER
Payer: MEDICARE

## 2018-09-17 DIAGNOSIS — Z13.820 SCREENING FOR OSTEOPOROSIS: ICD-10-CM

## 2018-09-17 DIAGNOSIS — Z78.0 POSTMENOPAUSAL: ICD-10-CM

## 2018-09-17 PROCEDURE — 77080 DXA BONE DENSITY AXIAL: CPT

## 2019-06-20 ENCOUNTER — HOSPITAL ENCOUNTER (OUTPATIENT)
Dept: ULTRASOUND IMAGING | Age: 69
Discharge: HOME | End: 2019-06-20
Attending: NURSE PRACTITIONER

## 2019-06-20 DIAGNOSIS — K43.2 INCISIONAL HERNIA, WITHOUT OBSTRUCTION OR GANGRENE: ICD-10-CM

## 2019-09-25 PROBLEM — Z23 ENCOUNTER FOR IMMUNIZATION: Status: RESOLVED | Noted: 2017-08-10 | Resolved: 2019-09-25

## 2020-03-18 ENCOUNTER — HOSPITAL ENCOUNTER (OUTPATIENT)
Dept: PHYSICAL THERAPY | Age: 70
End: 2020-03-18

## 2020-03-25 ENCOUNTER — APPOINTMENT (OUTPATIENT)
Dept: PHYSICAL THERAPY | Age: 70
End: 2020-03-25

## 2020-05-18 ENCOUNTER — HOSPITAL ENCOUNTER (OUTPATIENT)
Dept: PHYSICAL THERAPY | Age: 70
Discharge: HOME | End: 2020-05-18
Payer: MEDICARE

## 2020-05-18 PROCEDURE — 97110 THERAPEUTIC EXERCISES: CPT

## 2020-05-18 PROCEDURE — 97162 PT EVAL MOD COMPLEX 30 MIN: CPT

## 2020-05-18 NOTE — THERAPY EVALUATION
Rdaha Fearing : 1950 Primary: [employer] H[employer] Secondary:  Therapy Center at [facility] E [address], [address] [place] [address] [place], [address] [phone]   [phone] OUTPATIENT PHYSICAL THERAPY:  Initial Assessment 2020 ICD-10: Treatment Diagnosis: R26.81  Unsteadiness on feet R26.89  Other abnormalities of gait and mobility Precautions/Allergies:  
Acetaminophen; Penicillin g; and Other medication TREATMENT PLAN: 
Effective Dates: 2020 TO 8/15/2020 (90 days). Frequency/Duration: 1 time a week for 60 Day(s), and upon reassessment will adjust frequency and duration as progress indicates. MEDICAL/REFERRING DIAGNOSIS: 
Other abnormalities of gait and mobility [R26.89] DATE OF ONSET: gradual onset REFERRING PHYSICIAN: Asha Perez MD Orders: Eval and Treat Return MD Appointment: TBD INITIAL ASSESSMENT:  Ms. Kristopher Baig presents with what she describes as a \" balance issue\", which she said she has had since childhood. She denies falls, but reports lots of staggering and stumbling, along with some lightheadedness. She has had some PT for her balance in the past.  Also reports some pain in the tailbone from ? Being kicked there a few times. She uses a st cane for ambulation but would like to feel confident without it. Pt is expected to benefit from skilled PT for core strengthening and stabilization, balance and gait training. PROBLEM LIST (Impacting functional limitations): 1. Decreased Strength 2. Decreased ADL/Functional Activities 3. Decreased Ambulation Ability/Technique 4. Decreased Balance 5. Increased Pain 6. Decreased Activity Tolerance 7. Decreased Flexibility/Joint Mobility 8. Decreased [place] with Home Exercise Program 
9. INTERVENTIONS PLANNED: (Treatment may consist of any combination of the following) 1. Balance Exercise 2. Gait Training 3. Home Exercise Program (HEP) 4. Manual Therapy 5. Neuromuscular Re-education/Strengthening 6. Range of Motion (ROM) 7. Therapeutic Exercise/Strengthening 8. Modalities as needed and appropriate, including Aquatics and taping 9. GOALS: (Goals have been discussed and agreed upon with patient.) Short-Term Functional Goals: Time Frame: 4 weeks 1. Pt to perform sit to stand 8 times in 30 sec, for improved transitions and balance 2. Pt able to stand tandem > 5 sec 3. Improve BLE strength 1/2 grade for improved stability in standing Discharge Goals: Time Frame: 10 weeks 1. Pt able to stand tandem > 10 sec, for improved balance 2. Pt to report increased ease and safety of bathing 3. Pt able to amb > 20 min without increase in sx 4. Pt to be independent in HEP to maintain gains made in PT 
 
OUTCOME MEASURE:  
Tool Used: Richie Distel Balance Scale Score:  Initial: 34/56 Most Recent: X/56 (Date: -- ) Interpretation of Score: Each section is scored on a 0-4 scale, 0 representing the patients inability to perform the task and 4 representing independence. The scores of each section are added together for a total score of 56. The higher the patients score, the more independent the patient is. Any score below 45 indicates increased risk for falls. MEDICAL NECESSITY:  
· Patient is expected to demonstrate progress in strength, balance, coordination and functional technique ·  to improve safety during mobility, especially transitions and walking. · . REASON FOR SERVICES/OTHER COMMENTS: 
· Patient continues to require skilled intervention due to poor balance, and obvious unsteadiness on her feet. · . Total Duration: PT Patient Time In/Time Out Time In: 0460 Time Out: 0410 Rehabilitation Potential For Stated Goals: Good Regarding [place] therapy, I certify that the treatment plan above will be carried out by a therapist or under their direction. Thank you for this referral, Victor Baumgarten, PT    
 Referring Physician Signature: Tracy Yanez,* _______________________________ Date _____________ Information below was gathered on Initial Assessment--  5-18 PAIN/SUBJECTIVE:  
Initial: Pain Intensity 1: 3  Post Session:  3/10 HISTORY:  
History of Injury/Illness (Reason for Referral): 
Pt reports that she has had balance issues since puberty, but with no specific diagnosis. She denies any falls, but is very unsteady in standing and with position changes. She reports some pain in the tailbone ( she said it is from being kicked) but this is not part of her balance issue. She does sit with altered wt bearing, shifted off the L side. In standing she stands with wt shifted to the L. She says she is occasionally a little lightheaded when she stands, and in the past has had dx of vertigo. She is bipolar, and has been on anti-seizure meds; now is on anti-convulsants. Past Medical History/Comorbidities: Ms. Matthew Spears  has a past medical history of Abdominal wall hernias (11/15/2016), Allergic rhinitis (11/15/2016), Arthritis (11/15/2016), Bipolar disorder ([address] (11/15/2016), CHF (congestive heart failure) ([address] (11/15/2016), Depression, Diabetes mellitus type 2, controlled ([address] (11/15/2016), Diverticulitis of colon (without mention of hemorrhage)(562.11) (6/17/2013), Diverticulosis (11/15/2016), Dyslipidemia (11/15/2016), Esophageal reflux (11/15/2016), Fatigue (11/15/2016), Gout, Acute (11/15/2016), Heart failure ([address] Hypertension, Hypertension, essential, benign (11/15/2016), Hypomagnesemia (11/15/2016), Knee pain (11/15/2016), Menopause, Morbid obesity ([address] (11/15/2016), Osteoarthritis (11/15/2016), Short bowel syndrome (11/15/2016), Tremor, hereditary, benign (11/15/2016), 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   Ms. Matthew Spears  has a past surgical history that includes hx cholecystectomy; hx tubal ligation; hx hernia repair; hx colectomy; and hx breast biopsy (Right, 2005). Social History/Living Environment:  
  lives with daughter in a 2 story with lots of challenging stairs. Pt drives, is independent in ADLs Prior Level of Function/Work/Activity: 
retired Dominant Side:  
      RIGHT Personal Factors:   
      Age:  77 y.o. Ambulatory/Rehab Services H2 Model Falls Risk Assessment Risk Factors: 
     (2)  Symptomatic Depression (1)  Dizziness/Vertigo (2)  Any administered antiepileptics/anticonvulsants Ability to Rise from Chair: 
     (1)  Pushes up, successful in one attempt Falls Prevention Plan: No modifications necessary Total: (5 or greater = High Risk): 6  
©2010 [facility] [address] [place] [address] Patent #[id number]. Federal Law prohibits the replication, distribution or use without written permission from [facility] Current Medications:   
  
Current Outpatient Medications:  
  rosuvastatin (CRESTOR) 10 mg tablet, Take 10 mg by mouth nightly., Disp: , Rfl:  
  venlafaxine-SR (EFFEXOR-XR) 150 mg capsule, Take  by mouth daily. , Disp: , Rfl:  
  Venlafaxine 75 mg tr24, Take  by mouth., Disp: , Rfl:  
  topiramate (TOPAMAX) 100 mg tablet, Take 1 Tab by mouth daily. , Disp: 90 Tab, Rfl: 1 
  meloxicam (MOBIC) 7.5 mg tablet, Take 7.5 mg by mouth daily. As needed, Disp: , Rfl:  
  tolterodine ER (DETROL LA) 4 mg ER capsule, Take 4 mg by mouth daily. , Disp: , Rfl:  
  insulin glargine (LANTUS SOLOSTAR U-100 INSULIN) 100 unit/mL (3 mL) inpn, by SubCUTAneous route., Disp: , Rfl:  
  Liraglutide (VICTOZA 2-LISA) 0.6 mg/0.1 mL (18 mg/3 mL) pnij, 0.6 mg by SubCUTAneous route., Disp: , Rfl:  
  MULTIVITAMIN (ONE-A-DAY ESSENTIAL PO), Take  by mouth daily. , Disp: , Rfl:  
  metFORMIN (GLUCOPHAGE) 500 mg tablet, Take  by mouth two (2) times daily (with meals). , Disp: , Rfl:   Blood-Glucose Meter monitoring kit, by Other route two (2) times a day., Disp: 1 Kit, Rfl: 0 
  glucose blood VI test strips (BLOOD GLUCOSE TEST) strip, Can test up to 2 time a day, Disp: 100 Strip, Rfl: 2   carvedilol (COREG) 6.25 mg tablet, TAKE ONE TABLET BY MOUTH TWICE DAILY, Disp: , Rfl: 3 
  losartan (COZAAR) 100 mg tablet, Take 100 mg by mouth daily. Instead of lisinopril, Disp: , Rfl:  
  isosorbide mononitrate ER (IMDUR) 30 mg tablet, 30 mg. 1/2 oral at bedtime, Disp: , Rfl:  
  aspirin 81 mg chewable tablet, Take 81 mg by mouth daily. , Disp: , Rfl:  
  furosemide (LASIX) 40 mg tablet, Take  by mouth daily. , Disp: , Rfl:   
Date Last Reviewed:  5/18/2020 Number of Personal Factors/Comorbidities that affect the Plan of Care: 3+: HIGH COMPLEXITY EXAMINATION:  
Observation: 
Pt is overweight, said she is trying to lose wt. Pleasant, uses st cane for ambulation. A little fidgety during initial assessment. In static standing stands with increased wt bearing on R, and does exhibit some jerking in the trunk when balance is challenged. ROM:   
      [facility] throughout is [facility]. R ankle is stiff--she said due to a previous fx ( no surgery required). Strength:   
      R LE is generally 4 to 4+, good quality resistance L LE is generally 4-, quality of movement and resistance not as good as on R Neurological Screen: 
      Sensation: pt denies any changes in sensation--has no tingling, numbness or burning in the feet or legs Functional Mobility:  
      Gait/Ambulation:  Pt ambs with st cane ( it was adjusted to proper height). Has a rollicking gait, with limp when wt bearing on L. Stairs:  Not tested. Pt has stairs at the house where she is living and say that they are a challenge for her. Balance:   
      Poor. Pt scored 34 on Mejia Balance Assessment, putting her at high fall risk Body Structures Involved: 1. Joints 2. Muscles 3. Ligaments Body Functions Affected: 1. Mental 
2. Neuromusculoskeletal 
3. Movement Related 4. Vestibular Activities and Participation Affected: 1. General Tasks and Demands 2. Mobility 3. Self Care 4. Domestic Life 5. Interpersonal Interactions and Relationships 6. Community, Social and [place] Number of elements (examined above) that affect the Plan of Care: 3: MODERATE COMPLEXITY CLINICAL PRESENTATION:  
Presentation: Evolving clinical presentation with changing clinical characteristics: MODERATE COMPLEXITY CLINICAL DECISION MAKING:  
Use of outcome tool(s) and clinical judgement create a POC that gives a: Difficult prediction of patient's progress: HIGH COMPLEXITY

## 2020-05-18 NOTE — PROGRESS NOTES
Stan Sohail  : 1950  Primary: [employer] Medicare Hmo  Secondary:  [address] at [facility] Therapy  [address], [facility], [address]  [phone]   [phone]        OUTPATIENT PHYSICAL THERAPY: Daily Treatment Note 2020  Visit Count:  1    ICD-10: Treatment Diagnosis: R26.81  Unsteadiness on feet  R26.89  Other abnormalities of gait and mobility    TREATMENT PLAN:  Effective Dates: 2020 TO 8/15/2020 (90 days). Pre-treatment Symptoms/Complaints:  Pt reports that she has a balance issue and she needs to be stronger to feel better balanced. Pain: Initial: Pain Intensity 1: 3  Post Session:  3/10   Medications Last Reviewed:  2020  Updated Objective Findings:  See evaluation note from today  TREATMENT:     Evaluation  (X)    Therapeutic Exercise   ( 20 min  ):  To decrease pain, improve flexibility and motion, and increase strength. Will provide verbal and manual cues as needed to ensure proper performance of the exercises. Will increase range of motion, resistance and intensity as pt tolerates. Pt worked on repeated sit to stand from chair with arms, for improved sequence and technique--this was given as part of HEP  Practiced standing balance activities--feet together, turning, could not do eyes closed  Heel raises, with min support--2 x 12--had some difficulty with this       [facility] [facility] [employer] [facility]  Access Code: [email or web address]   URL: [email or web address]   Date: 2020   Prepared by: Chrys Cranker     Exercises   Seated Long Arc Quad - 10-15 reps - 1-3 sets - 1-2 hold - 2x daily - 5x weekly   Sit to Stand with Hands on Knees - 10-15 reps - 1-3 sets - 1-2 hold - 2x daily - 5x weekly   Heel rises with counter support - 10-15 reps - 1-3 sets - 1-2 hold - 2x daily - 5x weekly   Tandem Stance with Support - 10-15 reps - 1-3 sets - 1-2 hold - 2x daily - 5x weekly     Treatment/Session Summary:    · Response to Treatment: Pt tolerated therapy well today. This seems to be a long standing balance issue for her, which has gotten more worrisome for her. She also has some additional diagnoses which may impact her therapy. She seems motivated and is expected to do well with PT to work on general and specific strengthening, balance and gait. ·   · Communication/Consultation:  None today  · Equipment provided today:  None today  · Recommendations/Intent for next treatment session:   Next visit will focus on improving mobility and balance.     Total Treatment Billable Duration:  55 minutes   Chastity TE 1  PT Patient Time In/Time Out  Time In: 0310  Time Out: 0410  Anabell Harrison PT    Future Appointments   Date Time Provider Amol Houseri   5/26/2020  2:00 PM Paul Busch [facility]   9/9/2020 11:00 AM Leslie Hayes MD [facility]

## 2020-05-26 ENCOUNTER — APPOINTMENT (OUTPATIENT)
Dept: PHYSICAL THERAPY | Age: 70
End: 2020-05-26
Payer: MEDICARE
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
## 2021-03-12 ENCOUNTER — HOSPITAL ENCOUNTER (OUTPATIENT)
Dept: MAMMOGRAPHY | Age: 71
Discharge: HOME | End: 2021-03-12
Attending: NURSE PRACTITIONER
Payer: MEDICARE

## 2021-03-12 DIAGNOSIS — Z12.31 VISIT FOR SCREENING MAMMOGRAM: ICD-10-CM

## 2021-03-12 PROCEDURE — 77067 SCR MAMMO BI INCL CAD: CPT

## 2021-03-18 ENCOUNTER — HOSPITAL ENCOUNTER (OUTPATIENT)
Dept: MAMMOGRAPHY | Age: 71
Discharge: HOME | End: 2021-03-18
Attending: NURSE PRACTITIONER
Payer: MEDICARE

## 2021-03-18 DIAGNOSIS — R92.8 ABNORMAL SCREENING MAMMOGRAM: ICD-10-CM

## 2021-03-18 PROCEDURE — 76642 ULTRASOUND BREAST LIMITED: CPT

## 2021-03-18 PROCEDURE — 77065 DX MAMMO INCL CAD UNI: CPT

## 2021-03-19 ENCOUNTER — TRANSCRIBE ORDER (OUTPATIENT)
Dept: SCHEDULING | Age: 71
End: 2021-03-19

## 2021-03-19 DIAGNOSIS — Z13.820 SCREENING FOR OSTEOPOROSIS: Primary | ICD-10-CM

## 2021-03-19 DIAGNOSIS — Z78.0 POST-MENOPAUSAL: ICD-10-CM

## 2021-03-26 ENCOUNTER — HOSPITAL ENCOUNTER (OUTPATIENT)
Dept: MAMMOGRAPHY | Age: 71
Discharge: HOME | End: 2021-03-26
Attending: NURSE PRACTITIONER
Payer: MEDICARE

## 2021-03-26 DIAGNOSIS — Z13.820 SCREENING FOR OSTEOPOROSIS: ICD-10-CM

## 2021-03-26 DIAGNOSIS — Z78.0 POST-MENOPAUSAL: ICD-10-CM

## 2021-03-26 PROCEDURE — 77080 DXA BONE DENSITY AXIAL: CPT

## 2021-03-30 ENCOUNTER — HOSPITAL ENCOUNTER (OUTPATIENT)
Dept: MAMMOGRAPHY | Age: 71
Discharge: HOME | End: 2021-03-30
Attending: NURSE PRACTITIONER
Payer: MEDICARE

## 2021-03-30 VITALS — DIASTOLIC BLOOD PRESSURE: 76 MMHG | HEART RATE: 80 BPM | SYSTOLIC BLOOD PRESSURE: 136 MMHG

## 2021-03-30 DIAGNOSIS — N63.10 MASS OF RIGHT BREAST: ICD-10-CM

## 2021-03-30 DIAGNOSIS — N63.10 BREAST MASS, RIGHT: ICD-10-CM

## 2021-03-30 DIAGNOSIS — R92.8 ABNORMAL ULTRASOUND OF BREAST: ICD-10-CM

## 2021-03-30 PROCEDURE — 77065 DX MAMMO INCL CAD UNI: CPT

## 2021-03-30 PROCEDURE — 19083 BX BREAST 1ST LESION US IMAG: CPT

## 2021-03-30 PROCEDURE — 88305 TISSUE EXAM BY PATHOLOGIST: CPT

## 2021-03-30 PROCEDURE — 74011000250 HC RX REV CODE- 250

## 2021-03-30 RX ORDER — LIDOCAINE HYDROCHLORIDE 10 MG/ML
3 INJECTION INFILTRATION; PERINEURAL
Status: COMPLETED | OUTPATIENT
Start: 2021-03-30 | End: 2021-03-30

## 2021-03-30 RX ADMIN — LIDOCAINE HYDROCHLORIDE 3 ML: 10 INJECTION, SOLUTION INFILTRATION; PERINEURAL at 09:42

## 2021-03-31 NOTE — PROGRESS NOTES
I spoke with Ms Jennifer Gupta via phone to go over the results of her Rt breast U/S bx. Pathology: Benign, fibroadenoma.  She had no post bx issues or concerns and will return for her yearly screening March 2022

## 2021-11-03 ENCOUNTER — APPOINTMENT (OUTPATIENT)
Dept: GENERAL RADIOLOGY | Age: 71
End: 2021-11-03
Attending: EMERGENCY MEDICINE
Payer: MEDICARE

## 2021-11-03 ENCOUNTER — HOSPITAL ENCOUNTER (EMERGENCY)
Age: 71
Discharge: HOME | End: 2021-11-03
Attending: EMERGENCY MEDICINE
Payer: MEDICARE

## 2021-11-03 VITALS
RESPIRATION RATE: 16 BRPM | WEIGHT: 180 LBS | BODY MASS INDEX: 35.34 KG/M2 | SYSTOLIC BLOOD PRESSURE: 122 MMHG | OXYGEN SATURATION: 98 % | HEIGHT: 60 IN | TEMPERATURE: 98.1 F | DIASTOLIC BLOOD PRESSURE: 74 MMHG | HEART RATE: 80 BPM

## 2021-11-03 DIAGNOSIS — M25.562 ACUTE PAIN OF BOTH KNEES: Primary | ICD-10-CM

## 2021-11-03 DIAGNOSIS — M25.512 ACUTE PAIN OF LEFT SHOULDER: ICD-10-CM
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
## 2021-11-03 DIAGNOSIS — M25.561 ACUTE PAIN OF BOTH KNEES: Primary | ICD-10-CM

## 2021-11-03 PROCEDURE — 73562 X-RAY EXAM OF KNEE 3: CPT

## 2021-11-03 PROCEDURE — 71046 X-RAY EXAM CHEST 2 VIEWS: CPT

## 2021-11-03 PROCEDURE — 99283 EMERGENCY DEPT VISIT LOW MDM: CPT

## 2021-11-03 RX ORDER — CYCLOBENZAPRINE HCL 10 MG
10 TABLET ORAL
Qty: 10 TABLET | Refills: 0 | Status: SHIPPED | OUTPATIENT
Start: 2021-11-03 | End: 2021-11-13
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
## 2021-11-03 NOTE — ED NOTES
I have reviewed discharge instructions with the patient. The patient verbalized understanding. Patient left ED via Discharge Method: ambulatory to Home. Opportunity for questions and clarification provided. Patient given 1 scripts. To continue your aftercare when you leave the hospital, you may receive an automated call from our care team to check in on how you are doing. This is a free service and part of our promise to provide the best care and service to meet your aftercare needs.  If you have questions, or wish to unsubscribe from this service please call [phone]. Thank you for Choosing our [facility] Emergency Department.

## 2022-03-19 PROBLEM — Z13.39 SCREENING FOR ALCOHOLISM: Status: ACTIVE | Noted: 2017-08-10

## 2022-03-19 PROBLEM — F90.9 ATTENTION DEFICIT HYPERACTIVITY DISORDER (ADHD): Status: ACTIVE | Noted: 2017-12-18

## 2022-03-19 PROBLEM — F43.21 ADJUSTMENT DISORDER WITH DEPRESSED MOOD: Status: ACTIVE | Noted: 2017-12-18

## 2022-03-20 PROBLEM — F31.62 BIPOLAR DISORDER, CURRENT EPISODE MIXED, MODERATE (HCC): Status: ACTIVE | Noted: 2017-12-18

## 2022-05-24 ENCOUNTER — HOSPITAL ENCOUNTER (OUTPATIENT)
Dept: MRI IMAGING | Age: 72
Discharge: HOME | End: 2022-05-27
Payer: MEDICARE

## 2022-05-24 DIAGNOSIS — I51.89 RIGHT ATRIAL MASS: ICD-10-CM

## 2022-05-24 LAB
LV EF: 45 %
LVEF MODALITY: NORMAL

## 2022-05-24 PROCEDURE — 6360000004 HC RX CONTRAST MEDICATION

## 2022-05-24 PROCEDURE — A9579 GAD-BASE MR CONTRAST NOS,1ML: HCPCS

## 2022-05-24 PROCEDURE — 75561 CARDIAC MRI FOR MORPH W/DYE: CPT

## 2022-05-24 RX ORDER — SODIUM CHLORIDE 9 MG/ML
10 INJECTION, SOLUTION INTRAVENOUS AS NEEDED
Status: CANCELLED | OUTPATIENT
Start: 2022-05-24

## 2022-05-24 RX ORDER — SODIUM CHLORIDE 0.9 % (FLUSH) 0.9 %
10 SYRINGE (ML) INJECTION 2 TIMES DAILY
Status: DISCONTINUED | OUTPATIENT
Start: 2022-05-24 | End: 2022-05-28 | Stop reason: HOSPADM

## 2022-05-24 RX ORDER — SODIUM CHLORIDE 9 MG/ML
INJECTION, SOLUTION INTRAVENOUS CONTINUOUS
Status: CANCELLED | OUTPATIENT
Start: 2022-05-24

## 2022-05-24 RX ADMIN — GADOTERIDOL 30 ML: 279.3 INJECTION, SOLUTION INTRAVENOUS at 10:06

## 2022-05-31 ENCOUNTER — TELEPHONE (OUTPATIENT)
Dept: CARDIOLOGY CLINIC | Age: 72
End: 2022-05-31

## 2022-05-31 DIAGNOSIS — I42.0 DILATED CARDIOMYOPATHY (HCC): Primary | ICD-10-CM

## 2022-05-31 PROCEDURE — 75561 CARDIAC MRI FOR MORPH W/DYE: CPT | Performed by: INTERNAL MEDICINE

## 2022-05-31 NOTE — TELEPHONE ENCOUNTER
----- Message from Cherry Brandt MD sent at 5/31/2022 11:00 AM EDT -----  Please notify patient. The area in question is seen on MRI but not well defined. Dr Jonathon Bob recommends a AVE. If she is comfortable setting that up let's do so with him to evaluate RA mass. If she would like to see me first to discuss in person we can do so as well. I can also call her but have laryngitis right now making work difficult and phone call would be tough but should be better in a day or 2. Thanks.

## 2022-06-01 ENCOUNTER — TELEPHONE (OUTPATIENT)
Dept: CARDIOLOGY CLINIC | Age: 72
End: 2022-06-01

## 2022-06-01 DIAGNOSIS — I42.0 DILATED CARDIOMYOPATHY (HCC): Primary | ICD-10-CM

## 2022-06-06 ENCOUNTER — HOSPITAL ENCOUNTER (OUTPATIENT)
Dept: CARDIAC CATH/INVASIVE PROCEDURES | Age: 72
Discharge: HOME | End: 2022-06-06
Attending: INTERNAL MEDICINE | Admitting: INTERNAL MEDICINE
Payer: MEDICARE

## 2022-06-06 VITALS
DIASTOLIC BLOOD PRESSURE: 77 MMHG | RESPIRATION RATE: 16 BRPM | SYSTOLIC BLOOD PRESSURE: 122 MMHG | BODY MASS INDEX: 27.48 KG/M2 | OXYGEN SATURATION: 100 % | TEMPERATURE: 98 F | WEIGHT: 140 LBS | HEART RATE: 76 BPM | HEIGHT: 60 IN

## 2022-06-06 DIAGNOSIS — I42.0 DILATED CARDIOMYOPATHY (HCC): ICD-10-CM

## 2022-06-06 LAB
ANION GAP SERPL CALC-SCNC: 6 MMOL/L (ref 7–16)
BUN SERPL-MCNC: 36 MG/DL (ref 8–23)
CALCIUM SERPL-MCNC: 10.7 MG/DL (ref 8.3–10.4)
CHLORIDE SERPL-SCNC: 108 MMOL/L (ref 98–107)
CO2 SERPL-SCNC: 29 MMOL/L (ref 21–32)
CREAT SERPL-MCNC: 0.9 MG/DL (ref 0.6–1)
EKG ATRIAL RATE: 58 BPM
EKG DIAGNOSIS: NORMAL
EKG P AXIS: 68 DEGREES
EKG P-R INTERVAL: 184 MS
EKG Q-T INTERVAL: 408 MS
EKG QRS DURATION: 102 MS
EKG QTC CALCULATION (BAZETT): 400 MS
EKG R AXIS: -38 DEGREES
EKG T AXIS: 76 DEGREES
EKG VENTRICULAR RATE: 58 BPM
ERYTHROCYTE [DISTWIDTH] IN BLOOD BY AUTOMATED COUNT: 13.7 % (ref 11.9–14.6)
GLUCOSE SERPL-MCNC: 95 MG/DL (ref 65–100)
HCT VFR BLD AUTO: 42.2 % (ref 35.8–46.3)
HGB BLD-MCNC: 13.5 G/DL (ref 11.7–15.4)
MAGNESIUM SERPL-MCNC: 2.2 MG/DL (ref 1.8–2.4)
MCH RBC QN AUTO: 30.4 PG (ref 26.1–32.9)
MCHC RBC AUTO-ENTMCNC: 32 G/DL (ref 31.4–35)
MCV RBC AUTO: 95 FL (ref 79.6–97.8)
NRBC # BLD: 0 K/UL (ref 0–0.2)
PLATELET # BLD AUTO: 159 K/UL (ref 150–450)
PMV BLD AUTO: 10.1 FL (ref 9.4–12.3)
POTASSIUM SERPL-SCNC: 4.4 MMOL/L (ref 3.5–5.1)
RBC # BLD AUTO: 4.44 M/UL (ref 4.05–5.2)
SODIUM SERPL-SCNC: 143 MMOL/L (ref 136–145)
WBC # BLD AUTO: 5.5 K/UL (ref 4.3–11.1)

## 2022-06-06 PROCEDURE — 6370000000 HC RX 637 (ALT 250 FOR IP): Performed by: INTERNAL MEDICINE

## 2022-06-06 PROCEDURE — 99153 MOD SED SAME PHYS/QHP EA: CPT

## 2022-06-06 PROCEDURE — 6360000002 HC RX W HCPCS: Performed by: INTERNAL MEDICINE

## 2022-06-06 PROCEDURE — 85027 COMPLETE CBC AUTOMATED: CPT

## 2022-06-06 PROCEDURE — 80048 BASIC METABOLIC PNL TOTAL CA: CPT

## 2022-06-06 PROCEDURE — 93005 ELECTROCARDIOGRAM TRACING: CPT | Performed by: INTERNAL MEDICINE

## 2022-06-06 PROCEDURE — 99152 MOD SED SAME PHYS/QHP 5/>YRS: CPT

## 2022-06-06 PROCEDURE — 93325 DOPPLER ECHO COLOR FLOW MAPG: CPT

## 2022-06-06 PROCEDURE — 83735 ASSAY OF MAGNESIUM: CPT

## 2022-06-06 RX ORDER — LIDOCAINE HYDROCHLORIDE 20 MG/ML
SOLUTION OROPHARYNGEAL
Status: COMPLETED | OUTPATIENT
Start: 2022-06-06 | End: 2022-06-06

## 2022-06-06 RX ORDER — FENTANYL CITRATE 50 UG/ML
INJECTION, SOLUTION INTRAMUSCULAR; INTRAVENOUS
Status: COMPLETED | OUTPATIENT
Start: 2022-06-06 | End: 2022-06-06

## 2022-06-06 RX ORDER — MIDAZOLAM HYDROCHLORIDE 2 MG/2ML
INJECTION, SOLUTION INTRAMUSCULAR; INTRAVENOUS
Status: COMPLETED | OUTPATIENT
Start: 2022-06-06 | End: 2022-06-06

## 2022-06-06 RX ADMIN — MIDAZOLAM HYDROCHLORIDE 2 MG: 1 INJECTION, SOLUTION INTRAMUSCULAR; INTRAVENOUS at 12:17

## 2022-06-06 RX ADMIN — Medication 15 ML: at 11:56

## 2022-06-06 RX ADMIN — FENTANYL CITRATE 50 MCG: 50 INJECTION INTRAMUSCULAR; INTRAVENOUS at 12:11

## 2022-06-06 RX ADMIN — MIDAZOLAM HYDROCHLORIDE 2 MG: 1 INJECTION, SOLUTION INTRAMUSCULAR; INTRAVENOUS at 12:11

## 2022-06-06 ASSESSMENT — ENCOUNTER SYMPTOMS
SHORTNESS OF BREATH: 0
VOMITING: 0
BACK PAIN: 0
BLURRED VISION: 0
BLOATING: 0
DOUBLE VISION: 0
COUGH: 0
ORTHOPNEA: 0
NAUSEA: 0
ABDOMINAL PAIN: 0
HEMOPTYSIS: 0

## 2022-06-06 NOTE — PROGRESS NOTES
Patient received to [address] [place] [address] room # 17  Ambulatory from [facility]. Patient scheduled for AVE today with Dr Brad Rodriguez. Procedure reviewed & questions answered, voiced good understanding consent obtained & placed on chart. All medications and medical history reviewed. Will prep patient per orders. Patient & family updated on plan of care. The patient has a fraility score of 4-VULNERABLE, based on ambulation.

## 2022-06-06 NOTE — PROGRESS NOTES
AVE completed.   Patient numbed at 1200  Patient given 4mg versed, 50mcg fentanyl, see eMAR  Patient tolerated procedure well

## 2022-06-07 LAB
ECHO BSA: 1.64 M2
LV EF: 58 %
LVEF MODALITY: NORMAL

## 2022-06-07 PROCEDURE — 93320 DOPPLER ECHO COMPLETE: CPT | Performed by: INTERNAL MEDICINE

## 2022-06-07 PROCEDURE — 99152 MOD SED SAME PHYS/QHP 5/>YRS: CPT | Performed by: INTERNAL MEDICINE

## 2022-06-07 PROCEDURE — 93325 DOPPLER ECHO COLOR FLOW MAPG: CPT | Performed by: INTERNAL MEDICINE

## 2022-06-07 PROCEDURE — 93312 ECHO TRANSESOPHAGEAL: CPT | Performed by: INTERNAL MEDICINE

## 2022-06-29 ENCOUNTER — HOSPITAL ENCOUNTER (OUTPATIENT)
Dept: MAMMOGRAPHY | Age: 72
Discharge: HOME | End: 2022-07-02
Payer: MEDICARE

## 2022-06-29 DIAGNOSIS — Z12.31 VISIT FOR SCREENING MAMMOGRAM: ICD-10-CM

## 2022-06-29 PROCEDURE — 77063 BREAST TOMOSYNTHESIS BI: CPT

## 2022-07-06 ENCOUNTER — HOSPITAL ENCOUNTER (OUTPATIENT)
Dept: MAMMOGRAPHY | Age: 72
Discharge: HOME | End: 2022-07-09
Payer: MEDICARE

## 2022-07-06 DIAGNOSIS — R92.8 ABNORMAL SCREENING MAMMOGRAM: ICD-10-CM

## 2022-07-06 PROCEDURE — 77065 DX MAMMO INCL CAD UNI: CPT

## 2022-07-06 PROCEDURE — G0279 TOMOSYNTHESIS, MAMMO: HCPCS

## 2023-05-01 NOTE — PROGRESS NOTES
[facility] CARDIOLOGY  [address], [address]  PHONE: [phone]      23    NAME:  Altagracia Melendez  : 1950  MRN: [id number]         SUBJECTIVE:   Altagracia Melendez is a 71 y.o. female seen for a follow up visit regarding the following:     Chief Complaint   Patient presents with    Surgical Clearance    Cardiomyopathy    Congestive Heart Failure            HPI:  Follow up  Surgical Clearance, Cardiomyopathy, and Congestive Heart Failure   . Followed for NICM, chest discomfort, intermittent compliance with follow up. 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 She presents today for perioperative risk assessment prior to abdominoplasty post weight loss. She is exercising, yoga and walking, denies cp, dyspnea. She does feel dizzy at times. Presents today profoundly bradycardic, see EKG below. Past cardiac history:   May 2018 - EF 33%, mild MR   2019 - vasodilator perfusion study normal perfusion, EF 38%   Mar 2019 - EF 42%  May 2022       Echo - EF 55-60%, possible RA free wall mass       MRI - motion artifact, area in question in RA seen but of unclear significance, AVE recommended. EF 45%, RVEF 50%       AVE - EF 55-60%, no mass in the atrium, appears to be prominent IVC ridge             [place] CAD CHF Meds            spironolactone (ALDACTONE) 50 MG tablet (Taking)    Class: Historical Med    furosemide (LASIX) 40 MG tablet (Taking)    Class: Historical Med    losartan (COZAAR) 100 MG tablet (Taking)    Class: Historical Med    rosuvastatin (CRESTOR) 10 MG tablet (Taking)    Class: Historical Med          Key Antihyperglycemic Medications       Patient is on no antihyperglycemic meds. Past Medical History, Past Surgical History, Family history, Social History, and Medications were all reviewed with the patient today and updated as necessary.      Prior to Admission

## 2023-05-02 ENCOUNTER — OFFICE VISIT (OUTPATIENT)
Dept: CARDIOLOGY CLINIC | Age: 73
End: 2023-05-02
Payer: MEDICARE

## 2023-05-02 VITALS
SYSTOLIC BLOOD PRESSURE: 114 MMHG | DIASTOLIC BLOOD PRESSURE: 62 MMHG | WEIGHT: 146 LBS | HEIGHT: 60 IN | HEART RATE: 49 BPM | BODY MASS INDEX: 28.66 KG/M2

## 2023-05-02 DIAGNOSIS — I10 ESSENTIAL HYPERTENSION: ICD-10-CM

## 2023-05-02 DIAGNOSIS — E78.5 DYSLIPIDEMIA: ICD-10-CM

## 2023-05-02 DIAGNOSIS — Z01.818 PRE-OP EVALUATION: ICD-10-CM

## 2023-05-02 DIAGNOSIS — R00.1 BRADYCARDIA: ICD-10-CM

## 2023-05-02 DIAGNOSIS — I42.0 DILATED CARDIOMYOPATHY (HCC): Primary | ICD-10-CM

## 2023-05-02 PROCEDURE — 3074F SYST BP LT 130 MM HG: CPT | Performed by: INTERNAL MEDICINE

## 2023-05-02 PROCEDURE — 1123F ACP DISCUSS/DSCN MKR DOCD: CPT | Performed by: INTERNAL MEDICINE

## 2023-05-02 PROCEDURE — 1090F PRES/ABSN URINE INCON ASSESS: CPT | Performed by: INTERNAL MEDICINE

## 2023-05-02 PROCEDURE — 93000 ELECTROCARDIOGRAM COMPLETE: CPT | Performed by: INTERNAL MEDICINE

## 2023-05-02 PROCEDURE — G8399 PT W/DXA RESULTS DOCUMENT: HCPCS | Performed by: INTERNAL MEDICINE

## 2023-05-02 PROCEDURE — G8419 CALC BMI OUT NRM PARAM NOF/U: HCPCS | Performed by: INTERNAL MEDICINE

## 2023-05-02 PROCEDURE — 99214 OFFICE O/P EST MOD 30 MIN: CPT | Performed by: INTERNAL MEDICINE

## 2023-05-02 PROCEDURE — 3078F DIAST BP <80 MM HG: CPT | Performed by: INTERNAL MEDICINE

## 2023-05-02 PROCEDURE — G8427 DOCREV CUR MEDS BY ELIG CLIN: HCPCS | Performed by: INTERNAL MEDICINE

## 2023-05-02 PROCEDURE — 1036F TOBACCO NON-USER: CPT | Performed by: INTERNAL MEDICINE

## 2023-05-02 PROCEDURE — 3017F COLORECTAL CA SCREEN DOC REV: CPT | Performed by: INTERNAL MEDICINE

## 2023-05-02 RX ORDER — SPIRONOLACTONE 50 MG/1
TABLET, FILM COATED ORAL
COMMUNITY
Start: 2023-02-28

## 2023-05-02 ASSESSMENT — ENCOUNTER SYMPTOMS
ORTHOPNEA: 0
SHORTNESS OF BREATH: 0

## 2023-05-09 ENCOUNTER — HOSPITAL ENCOUNTER (EMERGENCY)
Age: 73
Discharge: HOME | End: 2023-05-09
Attending: EMERGENCY MEDICINE
Payer: MEDICARE

## 2023-05-09 ENCOUNTER — APPOINTMENT (OUTPATIENT)
Dept: CT IMAGING | Age: 73
End: 2023-05-09
Payer: MEDICARE

## 2023-05-09 ENCOUNTER — APPOINTMENT (OUTPATIENT)
Dept: GENERAL RADIOLOGY | Age: 73
End: 2023-05-09
Payer: MEDICARE

## 2023-05-09 VITALS
OXYGEN SATURATION: 100 % | RESPIRATION RATE: 14 BRPM | BODY MASS INDEX: 28.27 KG/M2 | WEIGHT: 144 LBS | DIASTOLIC BLOOD PRESSURE: 69 MMHG | TEMPERATURE: 98.7 F | HEART RATE: 76 BPM | HEIGHT: 60 IN | SYSTOLIC BLOOD PRESSURE: 118 MMHG

## 2023-05-09 DIAGNOSIS — B02.21 RAMSAY HUNT AURICULAR SYNDROME: Primary | ICD-10-CM

## 2023-05-09 LAB
ALBUMIN SERPL-MCNC: 3.7 G/DL (ref 3.2–4.6)
ALBUMIN/GLOB SERPL: 0.9 (ref 0.4–1.6)
ALP SERPL-CCNC: 116 U/L (ref 50–136)
ALT SERPL-CCNC: 36 U/L (ref 12–65)
ANION GAP SERPL CALC-SCNC: 5 MMOL/L (ref 2–11)
AST SERPL-CCNC: 21 U/L (ref 15–37)
BASOPHILS # BLD: 0 K/UL (ref 0–0.2)
BASOPHILS NFR BLD: 0 % (ref 0–2)
BILIRUB SERPL-MCNC: 0.3 MG/DL (ref 0.2–1.1)
BUN SERPL-MCNC: 49 MG/DL (ref 8–23)
CALCIUM SERPL-MCNC: 9.8 MG/DL (ref 8.3–10.4)
CHLORIDE SERPL-SCNC: 104 MMOL/L (ref 101–110)
CO2 SERPL-SCNC: 26 MMOL/L (ref 21–32)
CREAT SERPL-MCNC: 1.1 MG/DL (ref 0.6–1)
DIFFERENTIAL METHOD BLD: NORMAL
EOSINOPHIL # BLD: 0.1 K/UL (ref 0–0.8)
EOSINOPHIL NFR BLD: 1 % (ref 0.5–7.8)
ERYTHROCYTE [DISTWIDTH] IN BLOOD BY AUTOMATED COUNT: 13.4 % (ref 11.9–14.6)
GLOBULIN SER CALC-MCNC: 4 G/DL (ref 2.8–4.5)
GLUCOSE SERPL-MCNC: 84 MG/DL (ref 65–100)
HCT VFR BLD AUTO: 43.5 % (ref 35.8–46.3)
HGB BLD-MCNC: 14.1 G/DL (ref 11.7–15.4)
IMM GRANULOCYTES # BLD AUTO: 0 K/UL (ref 0–0.5)
IMM GRANULOCYTES NFR BLD AUTO: 1 % (ref 0–5)
INR PPP: 0.9
LYMPHOCYTES # BLD: 2 K/UL (ref 0.5–4.6)
LYMPHOCYTES NFR BLD: 32 % (ref 13–44)
MCH RBC QN AUTO: 31.2 PG (ref 26.1–32.9)
MCHC RBC AUTO-ENTMCNC: 32.4 G/DL (ref 31.4–35)
MCV RBC AUTO: 96.2 FL (ref 82–102)
MONOCYTES # BLD: 0.7 K/UL (ref 0.1–1.3)
MONOCYTES NFR BLD: 12 % (ref 4–12)
NEUTS SEG # BLD: 3.4 K/UL (ref 1.7–8.2)
NEUTS SEG NFR BLD: 54 % (ref 43–78)
NRBC # BLD: 0 K/UL (ref 0–0.2)
PLATELET # BLD AUTO: 209 K/UL (ref 150–450)
PMV BLD AUTO: 9.5 FL (ref 9.4–12.3)
POTASSIUM SERPL-SCNC: 4.1 MMOL/L (ref 3.5–5.1)
PROT SERPL-MCNC: 7.7 G/DL (ref 6.3–8.2)
PROTHROMBIN TIME: 13 SEC (ref 12.6–14.3)
RBC # BLD AUTO: 4.52 M/UL (ref 4.05–5.2)
SODIUM SERPL-SCNC: 135 MMOL/L (ref 133–143)
TROPONIN I SERPL HS-MCNC: 8.4 PG/ML (ref 0–37)
WBC # BLD AUTO: 6.3 K/UL (ref 4.3–11.1)

## 2023-05-09 PROCEDURE — 93005 ELECTROCARDIOGRAM TRACING: CPT | Performed by: EMERGENCY MEDICINE

## 2023-05-09 PROCEDURE — 85025 COMPLETE CBC W/AUTO DIFF WBC: CPT

## 2023-05-09 PROCEDURE — 71045 X-RAY EXAM CHEST 1 VIEW: CPT

## 2023-05-09 PROCEDURE — 99285 EMERGENCY DEPT VISIT HI MDM: CPT

## 2023-05-09 PROCEDURE — 84484 ASSAY OF TROPONIN QUANT: CPT

## 2023-05-09 PROCEDURE — 99221 1ST HOSP IP/OBS SF/LOW 40: CPT | Performed by: PSYCHIATRY & NEUROLOGY

## 2023-05-09 PROCEDURE — 80053 COMPREHEN METABOLIC PANEL: CPT

## 2023-05-09 PROCEDURE — 85610 PROTHROMBIN TIME: CPT

## 2023-05-09 RX ORDER — 0.9 % SODIUM CHLORIDE 0.9 %
100 INTRAVENOUS SOLUTION INTRAVENOUS ONCE
Status: DISCONTINUED | OUTPATIENT
Start: 2023-05-09 | End: 2023-05-09 | Stop reason: HOSPADM

## 2023-05-09 RX ORDER — PREDNISONE 20 MG/1
60 TABLET ORAL DAILY
Qty: 15 TABLET | Refills: 0 | Status: SHIPPED | OUTPATIENT
Start: 2023-05-09 | End: 2023-05-14

## 2023-05-09 RX ORDER — SODIUM CHLORIDE 0.9 % (FLUSH) 0.9 %
10 SYRINGE (ML) INJECTION
Status: DISCONTINUED | OUTPATIENT
Start: 2023-05-09 | End: 2023-05-09 | Stop reason: HOSPADM

## 2023-05-09 RX ORDER — OXYCODONE HYDROCHLORIDE 5 MG/1
5 TABLET ORAL EVERY 6 HOURS PRN
Qty: 12 TABLET | Refills: 0 | Status: SHIPPED | OUTPATIENT
Start: 2023-05-09 | End: 2023-05-12

## 2023-05-09 RX ORDER — VALACYCLOVIR HYDROCHLORIDE 1 G/1
1000 TABLET, FILM COATED ORAL 3 TIMES DAILY
Qty: 21 TABLET | Refills: 0 | Status: SHIPPED | OUTPATIENT
Start: 2023-05-09 | End: 2023-05-16

## 2023-05-09 ASSESSMENT — PAIN - FUNCTIONAL ASSESSMENT: PAIN_FUNCTIONAL_ASSESSMENT: 0-10

## 2023-05-09 ASSESSMENT — ENCOUNTER SYMPTOMS
NAUSEA: 0
VOMITING: 0
VISUAL CHANGE: 1
ABDOMINAL PAIN: 0
DIARRHEA: 0
RHINORRHEA: 0
COLOR CHANGE: 0
CONSTIPATION: 0
BACK PAIN: 0
SORE THROAT: 0
COUGH: 0
SHORTNESS OF BREATH: 0

## 2023-05-09 ASSESSMENT — PAIN SCALES - GENERAL: PAINLEVEL_OUTOF10: 6

## 2023-05-09 NOTE — ED NOTES
I have reviewed discharge instructions with the patient. The patient verbalized understanding. Patient left ED via Discharge Method: ambulatory to Home with (Daughter). Opportunity for questions and clarification provided. Patient given 3 scripts. To continue your aftercare when you leave the hospital, you may receive an automated call from our care team to check in on how you are doing. This is a free service and part of our promise to provide the best care and service to meet your aftercare needs.  If you have questions, or wish to unsubscribe from this service please call [phone]. Thank you for Choosing our [address] [place] [address] Emergency Department.         Abelino Rajan RN  05/09/23 [id number]

## 2023-05-09 NOTE — ED TRIAGE NOTES
Patient a 72 y/o Female reports to the ED with complaint of a facial droop on the left side of her face that started this morning. States some blurry vision. States pain to the right side of the face and some numbness to the right side of the face. States no difficulties ambulating. Hx of CHF. Left Pupil looks bigger than right pupil. Pain to palpation on the right side of the head. Daljit Hook, PGY-3- 74 y/o female, with PMHx of Gout, COPD, HTN, DM, CHF, CKD, Afib, Pulm HTN, Mitral and Tricuspid Valve replacement (mechanical), on 3L home O2, Presents to ED for evaluation of epistaxis.  Patient reports epistaxis began prior to arrival and lasted about 25 minutes.  Patient concerned as she is on blood thinner.  On arrival to ED epistaxis had stopped.   patient was intermittently placed on nonrebreather instead of nasal cannula secondary to bleeding.  Patient reports chronic dizziness, which she has had intermittently for months.  She also reports shortness of breath is not worse than usual.  No associated chest pain, fever, feeling like she will fall down, vomiting, diarrhea, abdominal pain.  Notes intermittent dark stools for which she has seen GI in the past.  Reports normal BM earlier today.  States she lives at home with her family.  Reports she would not be here but was not for the nosebleed.

## 2023-05-09 NOTE — CONSULTS
Failure Father         CHF    Cancer Brother      Social History     Tobacco Use    Smoking status: Never    Smokeless tobacco: Never   Substance Use Topics    Alcohol use: No      Current Facility-Administered Medications   Medication Dose Route Frequency Provider Last Rate Last Admin    iopamidol (ISOVUE-370) 76 % injection 50 mL  50 mL IntraVENous ONCE PRN Destiny Ball III, MD        sodium chloride flush 0.9 % injection 10 mL  10 mL IntraVENous ONCE PRN Destiny Ball III, MD        0.9 % sodium chloride bolus  100 mL IntraVENous Once Destiny Ball III, MD         Current Outpatient Medications   Medication Sig Dispense Refill    spironolactone (ALDACTONE) 50 MG tablet       furosemide (LASIX) 40 MG tablet Take by mouth daily      isosorbide mononitrate (IMDUR) 30 MG extended release tablet 1 tablet      losartan (COZAAR) 100 MG tablet Take 1 tablet by mouth daily      rosuvastatin (CRESTOR) 10 MG tablet Take 1 tablet by mouth      topiramate (TOPAMAX) 100 MG tablet Take 1 tablet by mouth daily      venlafaxine 75 MG extended release tablet Take by mouth      venlafaxine (EFFEXOR XR) 150 MG extended release capsule Take by mouth daily          Allergies   Allergen Reactions    Acetaminophen Anaphylaxis    Penicillin G Anaphylaxis    Other Other (See Comments)     [id number]       Review of Systems:   CONSTITUTIONAL: No weight loss, fever, chills, weakness or fatigue. HEENT: Eyes: No visual loss, blurred vision, double vision or yellow sclerae. Ears, Nose, Throat: No hearing loss, sneezing, congestion, runny nose or sore throat. SKIN: No rash or itching. CARDIOVASCULAR: No chest pain, chest pressure or chest discomfort. No palpitations or edema. RESPIRATORY: No shortness of breath, cough or sputum. GASTROINTESTINAL: No anorexia, nausea, vomiting or diarrhea. No abdominal pain or blood. GENITOURINARY: no burning with urination.          Objective:     Vitals:    05/09/23 1411   BP: 116/87   Pulse: 90   Resp: 14

## 2023-05-09 NOTE — ED PROVIDER NOTES
Emergency Department Provider Note       PCP: Karis Hernández MD   Age: 71 y.o. 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 Data Reviewed and Analyzed:  Category 1:   I independently ordered and reviewed each unique test.         Category 2:   I independently ordered and interpreted the ED EKG in the absence of a Cardiologist.    Rate: 75  EKG Interpretation: EKG Interpretation: sinus rhythm, no evidence of arrhythmia  ST Segments: Nonspecific ST segments - NO STEMI  I interpreted the X-rays no acute. Category 3: Discussion of management or test interpretation. Patient with Lali Hunt syndrome. Right ear pain and right droopy eyelid and facial droop. Neurology consulted who agrees. Will discharge with steroids and Valtrex and outpatient follow-up. The management of this patient was discussed with an external consultant. Risk of Complications and/or Morbidity of Patient Management:  Prescription drug management performed. Patient was discharged risks and benefits of hospitalization were considered. Shared medical decision making was utilized in creating the patients health plan today. Is this patient to be included in the SEP-1 core measure due to severe sepsis or septic shock? No Exclusion criteria - the patient is NOT to be included for SEP-1 Core Measure due to: Infection is not suspected      History      Chyna Pablo is a 71 y.o. female who presents to the Emergency Department with chief complaint of    Chief Complaint   Patient presents with    Facial Droop      With history of congestive heart failure. For the past 3 days has had right ear pain and diffuse surrounding headache. Getting worse.   For the past 2 days has noticed difficulty closing

## 2023-05-10 LAB
EKG ATRIAL RATE: 75 BPM
EKG DIAGNOSIS: NORMAL
EKG P AXIS: 71 DEGREES
EKG P-R INTERVAL: 216 MS
EKG Q-T INTERVAL: 390 MS
EKG QRS DURATION: 100 MS
EKG QTC CALCULATION (BAZETT): 435 MS
EKG R AXIS: -47 DEGREES
EKG T AXIS: 25 DEGREES
EKG VENTRICULAR RATE: 75 BPM

## 2023-07-10 ENCOUNTER — HOSPITAL ENCOUNTER (OUTPATIENT)
Dept: MAMMOGRAPHY | Age: 73
Discharge: HOME | End: 2023-07-13
Payer: MEDICARE

## 2023-07-10 DIAGNOSIS — Z12.31 ENCOUNTER FOR SCREENING MAMMOGRAM FOR HIGH-RISK PATIENT: ICD-10-CM

## 2023-07-10 PROCEDURE — 77063 BREAST TOMOSYNTHESIS BI: CPT

## 2023-08-07 PROBLEM — I49.5 SINUS NODE DYSFUNCTION (HCC): Status: ACTIVE | Noted: 2023-08-07

## 2023-08-07 NOTE — PROGRESS NOTES
[address] [facility]  [address] [address] [address] [facility]  PHONE: [phone]      23    NAME:  Terri Miller  : 1950  MRN: [id number]         SUBJECTIVE:   Terri Miller is a 71 y.o. female seen for a follow up visit regarding the following:     Chief Complaint   Patient presents with    3 Month Follow-Up    Cardiomyopathy    Results     Echo results    Congestive Heart Failure            HPI:  Follow up  3 Month Follow-Up, Cardiomyopathy, Results (Echo results), and Congestive Heart Failure   . Followed for NICM, chest discomfort, intermittent compliance with follow up. 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 BB stopped last visit d/t profound bradycardia. Her follow up echo off meds for 3 months shows continued recovery of her cardiac function. She feels great, back to her normal self off the BB. She's still doing push ups and leg lifts, backed off on her yoga d/t joint pain, is walking her dog 3 times a day. She states the PA at her nephrologist's office stopped her spironolactone, says that was done because she wants to have abdominoplasty and they said her BP was too low, but she says her BP today, at 110/70, is lower still. She felt awful without the medication so she resumed it, though she had been taking 2 a day, she only started back one a day. She also resumed her supplements. They also reduced the losartan to half dose. It turns out she was taking 100 mg of spironolactone. She doesn't have a diagnosis of cirrhosis. She's currently taking 50 mg of that. She felt coming off entirely made her constipated.         Past cardiac history:   May 2018 - EF 33%, mild MR   2019 - vasodilator perfusion study normal perfusion, EF 38%   Mar 2019 - EF 42%  May 2022       Echo - EF 55-60%, possible RA free wall mass       MRI - motion artifact, area in question in RA seen but of unclear

## 2023-08-08 ENCOUNTER — OFFICE VISIT (OUTPATIENT)
Age: 73
End: 2023-08-08
Payer: MEDICARE

## 2023-08-08 VITALS
HEART RATE: 71 BPM | WEIGHT: 150 LBS | DIASTOLIC BLOOD PRESSURE: 70 MMHG | HEIGHT: 60 IN | BODY MASS INDEX: 29.45 KG/M2 | SYSTOLIC BLOOD PRESSURE: 110 MMHG | OXYGEN SATURATION: 98 %

## 2023-08-08 DIAGNOSIS — I49.5 SINUS NODE DYSFUNCTION (HCC): ICD-10-CM

## 2023-08-08 DIAGNOSIS — I42.0 DILATED CARDIOMYOPATHY (HCC): Primary | ICD-10-CM

## 2023-08-08 PROCEDURE — 3017F COLORECTAL CA SCREEN DOC REV: CPT | Performed by: INTERNAL MEDICINE

## 2023-08-08 PROCEDURE — 1090F PRES/ABSN URINE INCON ASSESS: CPT | Performed by: INTERNAL MEDICINE

## 2023-08-08 PROCEDURE — G8399 PT W/DXA RESULTS DOCUMENT: HCPCS | Performed by: INTERNAL MEDICINE

## 2023-08-08 PROCEDURE — 99214 OFFICE O/P EST MOD 30 MIN: CPT | Performed by: INTERNAL MEDICINE

## 2023-08-08 PROCEDURE — G8419 CALC BMI OUT NRM PARAM NOF/U: HCPCS | Performed by: INTERNAL MEDICINE

## 2023-08-08 PROCEDURE — 1123F ACP DISCUSS/DSCN MKR DOCD: CPT | Performed by: INTERNAL MEDICINE

## 2023-08-08 PROCEDURE — 1036F TOBACCO NON-USER: CPT | Performed by: INTERNAL MEDICINE

## 2023-08-08 PROCEDURE — G8427 DOCREV CUR MEDS BY ELIG CLIN: HCPCS | Performed by: INTERNAL MEDICINE

## 2023-08-08 RX ORDER — SPIRONOLACTONE 50 MG/1
50 TABLET, FILM COATED ORAL DAILY
COMMUNITY
End: 2023-08-08 | Stop reason: DRUGHIGH

## 2023-08-08 RX ORDER — LOSARTAN POTASSIUM 50 MG/1
50 TABLET ORAL DAILY
Qty: 90 TABLET | Refills: 3 | Status: SHIPPED | OUTPATIENT
Start: 2023-08-08

## 2023-08-08 RX ORDER — SPIRONOLACTONE 25 MG/1
25 TABLET ORAL DAILY
Qty: 90 TABLET | Refills: 3 | Status: SHIPPED | OUTPATIENT
Start: 2023-08-08

## 2023-08-08 ASSESSMENT — ENCOUNTER SYMPTOMS
CONSTIPATION: 1
SHORTNESS OF BREATH: 0

## 2023-08-29 ENCOUNTER — HOSPITAL ENCOUNTER (OUTPATIENT)
Dept: MAMMOGRAPHY | Age: 73
Discharge: HOME | End: 2023-09-01
Payer: MEDICARE

## 2023-08-29 DIAGNOSIS — Z78.0 MENOPAUSE: ICD-10-CM

## 2023-08-29 PROCEDURE — 77080 DXA BONE DENSITY AXIAL: CPT

## 2023-09-27 ENCOUNTER — TELEPHONE (OUTPATIENT)
Dept: CARDIOLOGY CLINIC | Age: 73
End: 2023-09-27

## 2023-09-27 NOTE — TELEPHONE ENCOUNTER
Just sent letter to Dr Mario Rose regarding perioperative risk assessment. Please make sure that was received by their office.  thanks

## 2024-02-12 ENCOUNTER — HOSPITAL ENCOUNTER (OUTPATIENT)
Dept: MRI IMAGING | Age: 74
Discharge: HOME | End: 2024-02-15
Payer: MEDICARE

## 2024-02-12 DIAGNOSIS — S80.01XD: ICD-10-CM

## 2024-02-12 PROCEDURE — 73721 MRI JNT OF LWR EXTRE W/O DYE: CPT

## 2024-02-27 NOTE — PROGRESS NOTES
[facility] CARDIOLOGY  [address] [place] [address], [address]  [place] [address]  PHONE: [phone]      24    NAME:  Kaci Price  : 1950  MRN: [id number]         SUBJECTIVE:   Kaci Price is a 73 y.o. female seen for a follow up visit regarding the following:     Chief Complaint   Patient presents with    Follow-up    Congestive Heart Failure            HPI:  Follow up  Follow-up and Congestive Heart Failure   .    Followed for NICM, chest discomfort, intermittent compliance with follow up.    SN dysfunction, off BB therapy.  She's felt well.  She made the mistake of eating some ham which has created a great deal of thirst, has had over 66 oz of water today, fortunately no edema yet.  She's still trying to have a tummy tuck, working on the financing of it.  She is exercising regularly, walks her dog regularly, does 50 pushups daily and some light resistance.  She sees Dr Lam who has advised her against doing the surgery.   Her meds have been reduced d/t hypotension, still a little low today but apparently has been better.              Past cardiac history:   May 2018 - EF 33%, mild MR   2019 - vasodilator perfusion study normal perfusion, EF 38%   Mar 2019 - EF 42%  May 2022       Echo - EF 55-60%, possible RA free wall mass       MRI - motion artifact, area in question in RA seen but of unclear significance, AVE recommended. EF 45%, RVEF 50%       AVE - EF 55-60%, no mass in the atrium, appears to be prominent IVC ridge  Aug 2023       EF 55-60%, normal DF, RVSP 18              Key CAD CHF Meds            spironolactone (ALDACTONE) 25 MG tablet (Taking)    Sig - Route: Take 1 tablet by mouth daily - Oral    losartan (COZAAR) 50 MG tablet (Taking)    Sig - Route: Take 1 tablet by mouth daily - Oral    furosemide (LASIX) 40 MG tablet (Taking)    Class: Historical Med    rosuvastatin (CRESTOR) 10 MG tablet (Taking)    Class: Historical Med          Key Antihyperglycemic Medications

## 2024-02-29 ENCOUNTER — OFFICE VISIT (OUTPATIENT)
Age: 74
End: 2024-02-29
Payer: MEDICARE

## 2024-02-29 ENCOUNTER — TELEPHONE (OUTPATIENT)
Dept: ORTHOPEDIC SURGERY | Age: 74
End: 2024-02-29

## 2024-02-29 VITALS
DIASTOLIC BLOOD PRESSURE: 60 MMHG | SYSTOLIC BLOOD PRESSURE: 104 MMHG | WEIGHT: 155 LBS | BODY MASS INDEX: 30.43 KG/M2 | HEART RATE: 84 BPM | HEIGHT: 60 IN

## 2024-02-29 DIAGNOSIS — I42.0 DILATED CARDIOMYOPATHY (HCC): Primary | ICD-10-CM

## 2024-02-29 DIAGNOSIS — E78.5 DYSLIPIDEMIA: ICD-10-CM

## 2024-02-29 DIAGNOSIS — I49.5 SINUS NODE DYSFUNCTION (HCC): ICD-10-CM

## 2024-02-29 PROCEDURE — 1036F TOBACCO NON-USER: CPT | Performed by: INTERNAL MEDICINE

## 2024-02-29 PROCEDURE — G8417 CALC BMI ABV UP PARAM F/U: HCPCS | Performed by: INTERNAL MEDICINE

## 2024-02-29 PROCEDURE — 3017F COLORECTAL CA SCREEN DOC REV: CPT | Performed by: INTERNAL MEDICINE

## 2024-02-29 PROCEDURE — G8399 PT W/DXA RESULTS DOCUMENT: HCPCS | Performed by: INTERNAL MEDICINE

## 2024-02-29 PROCEDURE — 1090F PRES/ABSN URINE INCON ASSESS: CPT | Performed by: INTERNAL MEDICINE

## 2024-02-29 PROCEDURE — 99214 OFFICE O/P EST MOD 30 MIN: CPT | Performed by: INTERNAL MEDICINE

## 2024-02-29 PROCEDURE — G8484 FLU IMMUNIZE NO ADMIN: HCPCS | Performed by: INTERNAL MEDICINE

## 2024-02-29 PROCEDURE — 1123F ACP DISCUSS/DSCN MKR DOCD: CPT | Performed by: INTERNAL MEDICINE

## 2024-02-29 PROCEDURE — G8427 DOCREV CUR MEDS BY ELIG CLIN: HCPCS | Performed by: INTERNAL MEDICINE

## 2024-02-29 ASSESSMENT — ENCOUNTER SYMPTOMS: SHORTNESS OF BREATH: 0

## 2024-02-29 NOTE — TELEPHONE ENCOUNTER
Jake Henderson,  I need your advice on this one please.  Total Joint??  And would there be a rush?  Thanks    TECHNIQUE:  Multiplanar multisequence imaging right knee is performed as per  institution protocol.     FINDINGS:  Medial meniscus:   Anterior horn appears intact. Posterior horn nonarticular  tear medial margin.     Lateral meniscus:   Anterior horn horizontal articular tear to the undersurface.  Posterior horn undersurface articular partial tear.     Anterior cruciate ligament: Negative  Posterior cruciate ligament: Negative     Medial collateral ligament:  Negative  Lateral collateral complex:  Negative     Quadriceps tendon: Negative  Patellar tendon: Negative        Bone marrow:         Mild reactive degenerative changes of the articular  surfaces.  Articular cartilage: Severe osteoarthritis greatest in the patellofemoral  compartment. Grade 4 chondromalacia patellae.

## 2024-02-29 NOTE — PATIENT INSTRUCTIONS
Patient Education        Learning About the Mediterranean Diet  What is the Mediterranean diet?     The Mediterranean diet is a style of eating rather than a diet plan. It features foods eaten in [place], [place], [place], and other countries along the [place] Sea. It emphasizes eating foods like fish, fruits, vegetables, beans, high-fiber breads and whole grains, nuts, and olive oil. This style of eating includes limited red meat, cheese, and sweets.  Why choose the Mediterranean diet?  A Mediterranean-style diet may improve heart health. It contains more fat than other heart-healthy diets. But the fats are mainly from nuts, unsaturated oils (such as fish oils and olive oil), and certain nut or seed oils (such as canola, soybean, or flaxseed oil). These fats may help protect the heart and blood vessels.  How can you get started on the Mediterranean diet?  Here are some things you can do to switch to a more Mediterranean way of eating.  What to eat  Eat a variety of fruits and vegetables each day, such as grapes, blueberries, tomatoes, broccoli, peppers, figs, olives, spinach, eggplant, beans, lentils, and chickpeas.  Eat a variety of whole-grain foods each day, such as oats, brown rice, and whole wheat bread, pasta, and couscous.  Eat fish at least 2 times a week. Try tuna, salmon, mackerel, lake trout, herring, or sardines.  Eat moderate amounts of low-fat dairy products, such as milk, cheese, or yogurt.  Eat moderate amounts of poultry and eggs.  Choose healthy (unsaturated) fats, such as nuts, olive oil, and certain nut or seed oils like canola, soybean, and flaxseed.  Limit unhealthy (saturated) fats, such as butter, palm oil, and coconut oil. And limit fats found in animal products, such as meat and dairy products made with whole milk. Try to eat red meat only a few times a month in very small amounts.  Limit sweets and desserts to only a few times a week. This includes sugar-sweetened

## 2024-07-30 RX ORDER — LOSARTAN POTASSIUM 50 MG/1
50 TABLET ORAL DAILY
Qty: 90 TABLET | Refills: 0 | Status: SHIPPED | OUTPATIENT
Start: 2024-07-30

## 2024-07-30 RX ORDER — SPIRONOLACTONE 25 MG/1
25 TABLET ORAL DAILY
Qty: 90 TABLET | Refills: 0 | Status: SHIPPED | OUTPATIENT
Start: 2024-07-30

## 2024-07-30 NOTE — TELEPHONE ENCOUNTER
Requested Prescriptions     Pending Prescriptions Disp Refills    losartan (COZAAR) 50 MG tablet [Pharmacy Med Name: LOSARTAN POTASSIUM 50 MG TAB] 90 tablet 0     Sig: TAKE ONE TABLET BY MOUTH DAILY    spironolactone (ALDACTONE) 25 MG tablet [Pharmacy Med Name: SPIRONOLACTONE 25 MG TABLET] 90 tablet 0     Sig: TAKE ONE TABLET BY MOUTH DAILY     Rx verified last ov 2/29/24

## 2024-08-13 RX ORDER — SPIRONOLACTONE 25 MG/1
25 TABLET ORAL DAILY
Qty: 90 TABLET | Refills: 0 | OUTPATIENT
Start: 2024-08-13

## 2024-08-13 RX ORDER — LOSARTAN POTASSIUM 50 MG/1
50 TABLET ORAL DAILY
Qty: 90 TABLET | Refills: 0 | OUTPATIENT
Start: 2024-08-13

## 2024-08-21 ENCOUNTER — HOSPITAL ENCOUNTER (OUTPATIENT)
Dept: MAMMOGRAPHY | Age: 74
Discharge: HOME | End: 2024-08-24
Payer: MEDICARE

## 2024-08-21 VITALS — HEIGHT: 60 IN | BODY MASS INDEX: 29.45 KG/M2 | WEIGHT: 150 LBS

## 2024-08-21 DIAGNOSIS — Z12.31 ENCOUNTER FOR SCREENING MAMMOGRAM FOR MALIGNANT NEOPLASM OF BREAST: ICD-10-CM

## 2024-08-21 PROCEDURE — 77067 SCR MAMMO BI INCL CAD: CPT

## 2024-08-21 PROCEDURE — 77063 BREAST TOMOSYNTHESIS BI: CPT

## 2024-09-17 ENCOUNTER — OFFICE VISIT (OUTPATIENT)
Age: 74
End: 2024-09-17
Payer: MEDICARE

## 2024-09-17 VITALS
WEIGHT: 154 LBS | BODY MASS INDEX: 30.23 KG/M2 | SYSTOLIC BLOOD PRESSURE: 124 MMHG | HEIGHT: 60 IN | DIASTOLIC BLOOD PRESSURE: 80 MMHG | HEART RATE: 77 BPM

## 2024-09-17 DIAGNOSIS — I49.5 SINUS NODE DYSFUNCTION (HCC): ICD-10-CM

## 2024-09-17 DIAGNOSIS — I42.0 DILATED CARDIOMYOPATHY (HCC): Primary | ICD-10-CM

## 2024-09-17 PROCEDURE — G8399 PT W/DXA RESULTS DOCUMENT: HCPCS | Performed by: INTERNAL MEDICINE

## 2024-09-17 PROCEDURE — 3017F COLORECTAL CA SCREEN DOC REV: CPT | Performed by: INTERNAL MEDICINE

## 2024-09-17 PROCEDURE — 1090F PRES/ABSN URINE INCON ASSESS: CPT | Performed by: INTERNAL MEDICINE

## 2024-09-17 PROCEDURE — 93000 ELECTROCARDIOGRAM COMPLETE: CPT | Performed by: INTERNAL MEDICINE

## 2024-09-17 PROCEDURE — G8417 CALC BMI ABV UP PARAM F/U: HCPCS | Performed by: INTERNAL MEDICINE

## 2024-09-17 PROCEDURE — 99214 OFFICE O/P EST MOD 30 MIN: CPT | Performed by: INTERNAL MEDICINE

## 2024-09-17 PROCEDURE — 1123F ACP DISCUSS/DSCN MKR DOCD: CPT | Performed by: INTERNAL MEDICINE

## 2024-09-17 PROCEDURE — 1036F TOBACCO NON-USER: CPT | Performed by: INTERNAL MEDICINE

## 2024-09-17 PROCEDURE — G8427 DOCREV CUR MEDS BY ELIG CLIN: HCPCS | Performed by: INTERNAL MEDICINE

## 2024-09-17 ASSESSMENT — ENCOUNTER SYMPTOMS: SHORTNESS OF BREATH: 0

## 2024-12-24 ENCOUNTER — APPOINTMENT (OUTPATIENT)
Dept: CT IMAGING | Age: 74
End: 2024-12-24
Payer: MEDICARE

## 2024-12-24 ENCOUNTER — HOSPITAL ENCOUNTER (EMERGENCY)
Age: 74
Discharge: HOME | End: 2024-12-24
Attending: EMERGENCY MEDICINE
Payer: MEDICARE

## 2024-12-24 VITALS
OXYGEN SATURATION: 97 % | HEIGHT: 60 IN | WEIGHT: 154 LBS | BODY MASS INDEX: 30.23 KG/M2 | RESPIRATION RATE: 16 BRPM | SYSTOLIC BLOOD PRESSURE: 140 MMHG | TEMPERATURE: 98.4 F | HEART RATE: 81 BPM | DIASTOLIC BLOOD PRESSURE: 83 MMHG

## 2024-12-24 DIAGNOSIS — R10.9 ABDOMINAL PAIN, UNSPECIFIED ABDOMINAL LOCATION: Primary | ICD-10-CM

## 2024-12-24 LAB
ALBUMIN SERPL-MCNC: 3.6 G/DL (ref 3.2–4.6)
ALBUMIN/GLOB SERPL: 0.9 (ref 1–1.9)
ALP SERPL-CCNC: 149 U/L (ref 35–104)
ALT SERPL-CCNC: 15 U/L (ref 8–45)
ANION GAP SERPL CALC-SCNC: 13 MMOL/L (ref 7–16)
AST SERPL-CCNC: 23 U/L (ref 15–37)
BASOPHILS # BLD: 0 K/UL (ref 0–0.2)
BASOPHILS NFR BLD: 0 % (ref 0–2)
BILIRUB SERPL-MCNC: <0.2 MG/DL (ref 0–1.2)
BUN SERPL-MCNC: 32 MG/DL (ref 8–23)
CALCIUM SERPL-MCNC: 9.5 MG/DL (ref 8.8–10.2)
CHLORIDE SERPL-SCNC: 104 MMOL/L (ref 98–107)
CO2 SERPL-SCNC: 23 MMOL/L (ref 20–29)
CREAT SERPL-MCNC: 1.11 MG/DL (ref 0.6–1.1)
DIFFERENTIAL METHOD BLD: NORMAL
EOSINOPHIL # BLD: 0.1 K/UL (ref 0–0.8)
EOSINOPHIL NFR BLD: 1 % (ref 0.5–7.8)
ERYTHROCYTE [DISTWIDTH] IN BLOOD BY AUTOMATED COUNT: 14.2 % (ref 11.9–14.6)
GLOBULIN SER CALC-MCNC: 3.8 G/DL (ref 2.3–3.5)
GLUCOSE SERPL-MCNC: 106 MG/DL (ref 70–99)
HCT VFR BLD AUTO: 42.9 % (ref 35.8–46.3)
HGB BLD-MCNC: 14 G/DL (ref 11.7–15.4)
IMM GRANULOCYTES # BLD AUTO: 0 K/UL (ref 0–0.5)
IMM GRANULOCYTES NFR BLD AUTO: 0 % (ref 0–5)
LIPASE SERPL-CCNC: 38 U/L (ref 13–60)
LYMPHOCYTES # BLD: 1.7 K/UL (ref 0.5–4.6)
LYMPHOCYTES NFR BLD: 23 % (ref 13–44)
MCH RBC QN AUTO: 29.2 PG (ref 26.1–32.9)
MCHC RBC AUTO-ENTMCNC: 32.6 G/DL (ref 31.4–35)
MCV RBC AUTO: 89.4 FL (ref 82–102)
MONOCYTES # BLD: 0.7 K/UL (ref 0.1–1.3)
MONOCYTES NFR BLD: 9 % (ref 4–12)
NEUTS SEG # BLD: 4.9 K/UL (ref 1.7–8.2)
NEUTS SEG NFR BLD: 67 % (ref 43–78)
NRBC # BLD: 0 K/UL (ref 0–0.2)
PLATELET # BLD AUTO: 216 K/UL (ref 150–450)
PMV BLD AUTO: 9.5 FL (ref 9.4–12.3)
POTASSIUM SERPL-SCNC: 3.8 MMOL/L (ref 3.5–5.1)
PROT SERPL-MCNC: 7.3 G/DL (ref 6.3–8.2)
RBC # BLD AUTO: 4.8 M/UL (ref 4.05–5.2)
SODIUM SERPL-SCNC: 140 MMOL/L (ref 136–145)
WBC # BLD AUTO: 7.5 K/UL (ref 4.3–11.1)

## 2024-12-24 PROCEDURE — 80053 COMPREHEN METABOLIC PANEL: CPT

## 2024-12-24 PROCEDURE — 99285 EMERGENCY DEPT VISIT HI MDM: CPT

## 2024-12-24 PROCEDURE — 6360000004 HC RX CONTRAST MEDICATION: Performed by: EMERGENCY MEDICINE

## 2024-12-24 PROCEDURE — 74177 CT ABD & PELVIS W/CONTRAST: CPT

## 2024-12-24 PROCEDURE — 85025 COMPLETE CBC W/AUTO DIFF WBC: CPT

## 2024-12-24 PROCEDURE — 6370000000 HC RX 637 (ALT 250 FOR IP): Performed by: EMERGENCY MEDICINE

## 2024-12-24 PROCEDURE — 83690 ASSAY OF LIPASE: CPT

## 2024-12-24 RX ORDER — HYOSCYAMINE SULFATE 0.125 MG
0.12 TABLET ORAL EVERY 4 HOURS PRN
Qty: 20 TABLET | Refills: 0 | Status: SHIPPED | OUTPATIENT
Start: 2024-12-24

## 2024-12-24 RX ORDER — IOPAMIDOL 755 MG/ML
100 INJECTION, SOLUTION INTRAVASCULAR
Status: COMPLETED | OUTPATIENT
Start: 2024-12-24 | End: 2024-12-24

## 2024-12-24 RX ADMIN — IOPAMIDOL 100 ML: 755 INJECTION, SOLUTION INTRAVENOUS at 18:22

## 2024-12-24 RX ADMIN — HYOSCYAMINE SULFATE 0.12 MG: 0.12 TABLET ORAL; SUBLINGUAL at 20:16

## 2024-12-24 ASSESSMENT — ENCOUNTER SYMPTOMS
CHEST TIGHTNESS: 0
CONSTIPATION: 0
NAUSEA: 1
ABDOMINAL PAIN: 1
EYE PAIN: 0
COLOR CHANGE: 0
EYE REDNESS: 0
CHOKING: 0
HEMATEMESIS: 0
BACK PAIN: 0

## 2024-12-24 ASSESSMENT — LIFESTYLE VARIABLES
HOW MANY STANDARD DRINKS CONTAINING ALCOHOL DO YOU HAVE ON A TYPICAL DAY: PATIENT DOES NOT DRINK
HOW OFTEN DO YOU HAVE A DRINK CONTAINING ALCOHOL: NEVER

## 2024-12-24 ASSESSMENT — PAIN SCALES - GENERAL: PAINLEVEL_OUTOF10: 10

## 2024-12-24 ASSESSMENT — PAIN DESCRIPTION - ORIENTATION: ORIENTATION: RIGHT

## 2024-12-24 ASSESSMENT — PAIN DESCRIPTION - LOCATION: LOCATION: ABDOMEN

## 2024-12-24 ASSESSMENT — PAIN DESCRIPTION - DESCRIPTORS: DESCRIPTORS: BURNING

## 2024-12-24 ASSESSMENT — PAIN - FUNCTIONAL ASSESSMENT: PAIN_FUNCTIONAL_ASSESSMENT: 0-10

## 2024-12-24 NOTE — ED TRIAGE NOTES
Arrives via [facility] from home. RLQ abd pain. States burning pain. 10/10 comes and goes. Pain started a few weeks ago.

## 2024-12-24 NOTE — ED PROVIDER NOTES
Emergency Department Provider Note       PCP: Saad Nguyen MD   Age: 74 y.o.   Sex: female     DISPOSITION Decision To Discharge 12/24/2024 08:24:26 PM    ICD-10-CM    1. Abdominal pain, unspecified abdominal location  R10.9 [facility]          Medical Decision Making     Does have some tenderness in the right lower quadrant here.  Plan for screening labs here.  Will continue to monitor symptoms.    7:39 PM EST  Blood work tests are reassuring.  Nevertheless will obtain CT scan of abdomen and pelvis.  Will continue to monitor symptoms    8:26 PM EST  CT scan is mostly reassuring.  Chronic changes noted.  Some fluid-filled small intestines noted as well.  Will discharge home.  Will give prescription for Levsin.  Encouraged referral to GI.     1 chronic illness with exacerbation.  Prescription drug management performed.  Shared medical decision making was utilized in creating the patients health plan today.  I independently ordered and reviewed each unique test.    I reviewed external records: ED visit note from a different ED.   I reviewed external records: provider visit note from PCP.  I reviewed external records: provider visit note from outside specialist.  I reviewed external records: previous lab results from outside ED.  I reviewed external records: previous imaging study including radiologist interpretation.       I interpreted the CT Scan no acute surgical pathology.              History     Patient presents ER with complaints of abdominal pain.  Patient states she has had some degree of right-sided lower abdominal pain for a couple weeks.  States pain comes and feels like \"sharp stabbing\".  States she had an episode this evening associated nausea and vomiting.  Denies any fevers or chills.  Reports a normal bowel movement today.  Denies any urinary symptoms.    The history is provided by the patient.   Abdominal Pain  Pain location:  RLQ  Pain quality: aching

## 2024-12-25 NOTE — DISCHARGE INSTRUCTIONS
Take medications as prescribed  Follow-up with your primary care  Follow-up with gastroenterology  Return to the ER for any new, worsening or life-threatening symptom

## 2025-07-18 ENCOUNTER — TRANSCRIBE ORDERS (OUTPATIENT)
Dept: SCHEDULING | Age: 75
End: 2025-07-18

## 2025-07-18 DIAGNOSIS — K76.0 FATTY LIVER: Primary | ICD-10-CM

## 2025-09-06 ENCOUNTER — HOSPITAL ENCOUNTER (OUTPATIENT)
Dept: MAMMOGRAPHY | Age: 75
End: 2025-09-06
Payer: MEDICARE

## 2025-09-06 DIAGNOSIS — Z12.31 ENCOUNTER FOR SCREENING MAMMOGRAM FOR BREAST CANCER: ICD-10-CM

## 2025-09-06 PROCEDURE — 77063 BREAST TOMOSYNTHESIS BI: CPT
